# Patient Record
Sex: FEMALE | Race: WHITE | URBAN - NONMETROPOLITAN AREA
[De-identification: names, ages, dates, MRNs, and addresses within clinical notes are randomized per-mention and may not be internally consistent; named-entity substitution may affect disease eponyms.]

---

## 2017-05-03 DIAGNOSIS — I35.0 NONRHEUMATIC AORTIC VALVE STENOSIS: Primary | ICD-10-CM

## 2017-05-03 DIAGNOSIS — J96.10 CHRONIC RESPIRATORY FAILURE, UNSPECIFIED WHETHER WITH HYPOXIA OR HYPERCAPNIA (HCC): ICD-10-CM

## 2017-05-11 RX ORDER — LEVOTHYROXINE SODIUM 0.07 MG/1
75 TABLET ORAL DAILY
Qty: 30 TABLET | Refills: 5 | Status: SHIPPED | OUTPATIENT
Start: 2017-05-11 | End: 2017-11-05 | Stop reason: SDUPTHER

## 2017-05-15 RX ORDER — SIMVASTATIN 20 MG
20 TABLET ORAL NIGHTLY
Qty: 90 TABLET | Refills: 1 | Status: SHIPPED | OUTPATIENT
Start: 2017-05-15 | End: 2017-10-03 | Stop reason: SDUPTHER

## 2017-05-15 RX ORDER — SPIRONOLACTONE 50 MG/1
50 TABLET, FILM COATED ORAL DAILY
Qty: 90 TABLET | Refills: 1 | Status: SHIPPED | OUTPATIENT
Start: 2017-05-15 | End: 2018-05-17 | Stop reason: SDUPTHER

## 2017-05-23 ENCOUNTER — OFFICE VISIT (OUTPATIENT)
Dept: FAMILY MEDICINE CLINIC | Age: 82
End: 2017-05-23
Payer: MEDICARE

## 2017-05-23 VITALS
HEART RATE: 56 BPM | SYSTOLIC BLOOD PRESSURE: 128 MMHG | OXYGEN SATURATION: 90 % | DIASTOLIC BLOOD PRESSURE: 64 MMHG | BODY MASS INDEX: 33.23 KG/M2 | WEIGHT: 159 LBS

## 2017-05-23 DIAGNOSIS — M25.512 CHRONIC PAIN OF BOTH SHOULDERS: Primary | ICD-10-CM

## 2017-05-23 DIAGNOSIS — E03.9 HYPOTHYROIDISM (ACQUIRED): ICD-10-CM

## 2017-05-23 DIAGNOSIS — M25.511 CHRONIC PAIN OF BOTH SHOULDERS: Primary | ICD-10-CM

## 2017-05-23 DIAGNOSIS — I10 ESSENTIAL HYPERTENSION: ICD-10-CM

## 2017-05-23 DIAGNOSIS — I50.9 CHRONIC CONGESTIVE HEART FAILURE, UNSPECIFIED CONGESTIVE HEART FAILURE TYPE: ICD-10-CM

## 2017-05-23 DIAGNOSIS — J41.0 SIMPLE CHRONIC BRONCHITIS (HCC): Chronic | ICD-10-CM

## 2017-05-23 DIAGNOSIS — G89.29 CHRONIC PAIN OF BOTH SHOULDERS: Primary | ICD-10-CM

## 2017-05-23 PROCEDURE — 1123F ACP DISCUSS/DSCN MKR DOCD: CPT | Performed by: FAMILY MEDICINE

## 2017-05-23 PROCEDURE — G8427 DOCREV CUR MEDS BY ELIG CLIN: HCPCS | Performed by: FAMILY MEDICINE

## 2017-05-23 PROCEDURE — 1090F PRES/ABSN URINE INCON ASSESS: CPT | Performed by: FAMILY MEDICINE

## 2017-05-23 PROCEDURE — 3023F SPIROM DOC REV: CPT | Performed by: FAMILY MEDICINE

## 2017-05-23 PROCEDURE — 4040F PNEUMOC VAC/ADMIN/RCVD: CPT | Performed by: FAMILY MEDICINE

## 2017-05-23 PROCEDURE — G8926 SPIRO NO PERF OR DOC: HCPCS | Performed by: FAMILY MEDICINE

## 2017-05-23 PROCEDURE — 99214 OFFICE O/P EST MOD 30 MIN: CPT | Performed by: FAMILY MEDICINE

## 2017-05-23 PROCEDURE — G8417 CALC BMI ABV UP PARAM F/U: HCPCS | Performed by: FAMILY MEDICINE

## 2017-05-23 PROCEDURE — 1036F TOBACCO NON-USER: CPT | Performed by: FAMILY MEDICINE

## 2017-05-23 RX ORDER — CITALOPRAM 10 MG/1
TABLET ORAL
Refills: 0 | COMMUNITY
Start: 2017-05-11 | End: 2017-05-23 | Stop reason: SDUPTHER

## 2017-05-23 ASSESSMENT — ENCOUNTER SYMPTOMS: SHORTNESS OF BREATH: 0

## 2017-05-24 DIAGNOSIS — M25.512 CHRONIC PAIN OF BOTH SHOULDERS: ICD-10-CM

## 2017-05-24 DIAGNOSIS — G89.29 CHRONIC PAIN OF BOTH SHOULDERS: ICD-10-CM

## 2017-05-24 DIAGNOSIS — M25.511 CHRONIC PAIN OF BOTH SHOULDERS: ICD-10-CM

## 2017-06-12 RX ORDER — TRAZODONE HYDROCHLORIDE 50 MG/1
25 TABLET ORAL NIGHTLY
Qty: 30 TABLET | Refills: 5 | Status: SHIPPED | OUTPATIENT
Start: 2017-06-12 | End: 2018-07-10 | Stop reason: SDUPTHER

## 2017-06-22 ENCOUNTER — TELEPHONE (OUTPATIENT)
Dept: FAMILY MEDICINE CLINIC | Age: 82
End: 2017-06-22

## 2017-06-22 DIAGNOSIS — T14.8XXA BRUISING: Primary | ICD-10-CM

## 2017-06-26 DIAGNOSIS — T14.8XXA BRUISING: ICD-10-CM

## 2017-06-26 LAB
BASOPHILS ABSOLUTE: NORMAL /ΜL
BASOPHILS RELATIVE PERCENT: NORMAL %
EOSINOPHILS ABSOLUTE: NORMAL /ΜL
EOSINOPHILS RELATIVE PERCENT: NORMAL %
HCT VFR BLD CALC: NORMAL % (ref 36–46)
HEMOGLOBIN: NORMAL G/DL (ref 12–16)
LYMPHOCYTES ABSOLUTE: NORMAL /ΜL
LYMPHOCYTES RELATIVE PERCENT: NORMAL %
MCH RBC QN AUTO: NORMAL PG
MCHC RBC AUTO-ENTMCNC: NORMAL G/DL
MCV RBC AUTO: NORMAL FL
MONOCYTES ABSOLUTE: NORMAL /ΜL
MONOCYTES RELATIVE PERCENT: NORMAL %
NEUTROPHILS ABSOLUTE: NORMAL /ΜL
NEUTROPHILS RELATIVE PERCENT: NORMAL %
PDW BLD-RTO: NORMAL %
PLATELET # BLD: NORMAL K/ΜL
PMV BLD AUTO: NORMAL FL
RBC # BLD: NORMAL 10^6/ΜL
WBC # BLD: NORMAL 10^3/ML

## 2017-07-06 DIAGNOSIS — T14.8XXA BRUISING: ICD-10-CM

## 2017-08-07 ENCOUNTER — OFFICE VISIT (OUTPATIENT)
Dept: PULMONOLOGY | Age: 82
End: 2017-08-07
Payer: MEDICARE

## 2017-08-07 VITALS
HEIGHT: 58 IN | WEIGHT: 153 LBS | HEART RATE: 56 BPM | OXYGEN SATURATION: 93 % | SYSTOLIC BLOOD PRESSURE: 149 MMHG | DIASTOLIC BLOOD PRESSURE: 71 MMHG | RESPIRATION RATE: 16 BRPM | TEMPERATURE: 97.1 F | BODY MASS INDEX: 32.12 KG/M2

## 2017-08-07 DIAGNOSIS — I50.42 CHRONIC COMBINED SYSTOLIC AND DIASTOLIC CONGESTIVE HEART FAILURE (HCC): ICD-10-CM

## 2017-08-07 DIAGNOSIS — J96.11 CHRONIC RESPIRATORY FAILURE WITH HYPOXIA (HCC): ICD-10-CM

## 2017-08-07 DIAGNOSIS — J44.9 COPD, SEVERITY TO BE DETERMINED (HCC): Primary | ICD-10-CM

## 2017-08-07 PROCEDURE — G8417 CALC BMI ABV UP PARAM F/U: HCPCS | Performed by: INTERNAL MEDICINE

## 2017-08-07 PROCEDURE — 1123F ACP DISCUSS/DSCN MKR DOCD: CPT | Performed by: INTERNAL MEDICINE

## 2017-08-07 PROCEDURE — 1036F TOBACCO NON-USER: CPT | Performed by: INTERNAL MEDICINE

## 2017-08-07 PROCEDURE — 99213 OFFICE O/P EST LOW 20 MIN: CPT | Performed by: INTERNAL MEDICINE

## 2017-08-07 PROCEDURE — G8926 SPIRO NO PERF OR DOC: HCPCS | Performed by: INTERNAL MEDICINE

## 2017-08-07 PROCEDURE — G8427 DOCREV CUR MEDS BY ELIG CLIN: HCPCS | Performed by: INTERNAL MEDICINE

## 2017-08-07 PROCEDURE — 1090F PRES/ABSN URINE INCON ASSESS: CPT | Performed by: INTERNAL MEDICINE

## 2017-08-07 PROCEDURE — 4040F PNEUMOC VAC/ADMIN/RCVD: CPT | Performed by: INTERNAL MEDICINE

## 2017-08-07 PROCEDURE — 3023F SPIROM DOC REV: CPT | Performed by: INTERNAL MEDICINE

## 2017-08-08 ASSESSMENT — ENCOUNTER SYMPTOMS
BACK PAIN: 1
SHORTNESS OF BREATH: 1
GASTROINTESTINAL NEGATIVE: 1
EYES NEGATIVE: 1

## 2017-08-10 ENCOUNTER — OFFICE VISIT (OUTPATIENT)
Dept: PULMONOLOGY | Age: 82
End: 2017-08-10
Payer: MEDICARE

## 2017-08-10 VITALS — OXYGEN SATURATION: 96 % | HEIGHT: 58 IN | WEIGHT: 153 LBS | BODY MASS INDEX: 32.12 KG/M2 | HEART RATE: 63 BPM

## 2017-08-10 DIAGNOSIS — J44.9 CHRONIC OBSTRUCTIVE PULMONARY DISEASE, UNSPECIFIED COPD TYPE (HCC): ICD-10-CM

## 2017-08-10 DIAGNOSIS — J96.11 CHRONIC RESPIRATORY FAILURE WITH HYPOXIA (HCC): ICD-10-CM

## 2017-08-10 DIAGNOSIS — I50.9 CHRONIC CONGESTIVE HEART FAILURE, UNSPECIFIED CONGESTIVE HEART FAILURE TYPE: Primary | ICD-10-CM

## 2017-08-10 PROCEDURE — 94620 PR PULMONARY STRESS TESTING,SIMPLE: CPT | Performed by: INTERNAL MEDICINE

## 2017-08-10 PROCEDURE — 1036F TOBACCO NON-USER: CPT | Performed by: INTERNAL MEDICINE

## 2017-08-12 RX ORDER — BUMETANIDE 1 MG/1
TABLET ORAL
Qty: 290 TABLET | Refills: 1 | Status: SHIPPED | OUTPATIENT
Start: 2017-08-12 | End: 2018-09-16 | Stop reason: SDUPTHER

## 2017-08-14 RX ORDER — OMEPRAZOLE 20 MG/1
20 CAPSULE, DELAYED RELEASE ORAL 2 TIMES DAILY
Qty: 60 CAPSULE | Refills: 5 | Status: SHIPPED | OUTPATIENT
Start: 2017-08-14 | End: 2018-05-23 | Stop reason: SDUPTHER

## 2017-09-06 DIAGNOSIS — J44.9 COPD, SEVERITY TO BE DETERMINED (HCC): ICD-10-CM

## 2017-10-24 LAB
BASOPHILS ABSOLUTE: 0 10'3/UL (ref 0.1–0.2)
BASOPHILS RELATIVE PERCENT: 0.8 % (ref 0–1.7)
CHLORIDE BLD-SCNC: 106 MMOL/L (ref 97–107)
CO2: 27 MMOL/L (ref 21–32)
CREAT SERPL-MCNC: 1.33 MG/DL (ref 0.55–1.02)
EOSINOPHILS ABSOLUTE: 0.2 10'3/UL (ref 0–0.4)
EOSINOPHILS RELATIVE PERCENT: 2.4 % (ref 0–6.4)
HCT VFR BLD CALC: 40.4 % (ref 34.6–44.1)
HEMOGLOBIN: 13.5 G/DL (ref 11.7–14.9)
LYMPHOCYTES ABSOLUTE: 1.3 10'3/UL (ref 0.5–3.5)
LYMPHOCYTES RELATIVE PERCENT: 21.2 % (ref 17.4–45.9)
MCH RBC QN AUTO: 32.9 PG (ref 27.8–33.2)
MCHC RBC AUTO-ENTMCNC: 33.4 G/DL (ref 32.7–34.8)
MCV RBC AUTO: 98.6 FL (ref 83–97.4)
MONOCYTES ABSOLUTE: 0.5 10'3/UL (ref 0.2–0.8)
MONOCYTES RELATIVE PERCENT: 7.3 % (ref 4.4–12)
NEUTROPHILS ABSOLUTE: 4.2 10'3/UL (ref 1.5–5.6)
NEUTROPHILS SEGMENTED: 68.3 % (ref 41.2–72.1)
PDW BLD-RTO: 13 % (ref 12.2–15.8)
PLATELET # BLD: 239 10'3/UL (ref 122–359)
PMV BLD AUTO: 8.6 FL (ref 7.6–10.6)
POTASSIUM SERPL-SCNC: 3.8 MMOL/L (ref 3.5–5.1)
RBC # BLD: 4.1 10'6/UL (ref 3.85–4.88)
SODIUM BLD-SCNC: 143 MMOL/L (ref 136–145)
TSH SERPL DL<=0.05 MIU/L-ACNC: 1.1 UIU/ML (ref 0.36–3.74)
WBC: 6.2 10'3/UL (ref 3.2–9.3)

## 2017-10-24 NOTE — PROGRESS NOTES
Noted, may review at planned f/u appt.  Noting increased renal strain to review further at follow up

## 2017-11-06 RX ORDER — LEVOTHYROXINE SODIUM 0.07 MG/1
75 TABLET ORAL DAILY
Qty: 30 TABLET | Refills: 5 | Status: SHIPPED | OUTPATIENT
Start: 2017-11-06 | End: 2018-06-07 | Stop reason: SDUPTHER

## 2017-11-07 ENCOUNTER — OFFICE VISIT (OUTPATIENT)
Dept: FAMILY MEDICINE CLINIC | Age: 82
End: 2017-11-07
Payer: MEDICARE

## 2017-11-07 VITALS
SYSTOLIC BLOOD PRESSURE: 130 MMHG | HEART RATE: 74 BPM | WEIGHT: 158 LBS | OXYGEN SATURATION: 97 % | HEIGHT: 58 IN | BODY MASS INDEX: 33.17 KG/M2 | DIASTOLIC BLOOD PRESSURE: 76 MMHG

## 2017-11-07 DIAGNOSIS — I10 ESSENTIAL HYPERTENSION: Primary | ICD-10-CM

## 2017-11-07 DIAGNOSIS — N18.30 CKD (CHRONIC KIDNEY DISEASE) STAGE 3, GFR 30-59 ML/MIN (HCC): ICD-10-CM

## 2017-11-07 DIAGNOSIS — I50.9 CHRONIC CONGESTIVE HEART FAILURE, UNSPECIFIED CONGESTIVE HEART FAILURE TYPE: ICD-10-CM

## 2017-11-07 DIAGNOSIS — F39 MOOD DISORDER (HCC): ICD-10-CM

## 2017-11-07 DIAGNOSIS — E78.2 MIXED HYPERLIPIDEMIA: ICD-10-CM

## 2017-11-07 PROCEDURE — G8427 DOCREV CUR MEDS BY ELIG CLIN: HCPCS | Performed by: FAMILY MEDICINE

## 2017-11-07 PROCEDURE — 1036F TOBACCO NON-USER: CPT | Performed by: FAMILY MEDICINE

## 2017-11-07 PROCEDURE — 99214 OFFICE O/P EST MOD 30 MIN: CPT | Performed by: FAMILY MEDICINE

## 2017-11-07 PROCEDURE — 4040F PNEUMOC VAC/ADMIN/RCVD: CPT | Performed by: FAMILY MEDICINE

## 2017-11-07 PROCEDURE — G8417 CALC BMI ABV UP PARAM F/U: HCPCS | Performed by: FAMILY MEDICINE

## 2017-11-07 PROCEDURE — 1090F PRES/ABSN URINE INCON ASSESS: CPT | Performed by: FAMILY MEDICINE

## 2017-11-07 PROCEDURE — 1123F ACP DISCUSS/DSCN MKR DOCD: CPT | Performed by: FAMILY MEDICINE

## 2017-11-07 PROCEDURE — G8484 FLU IMMUNIZE NO ADMIN: HCPCS | Performed by: FAMILY MEDICINE

## 2017-11-07 RX ORDER — CITALOPRAM 10 MG/1
5 TABLET ORAL DAILY
Qty: 90 TABLET | Refills: 3 | Status: SHIPPED | OUTPATIENT
Start: 2017-11-07 | End: 2018-09-15 | Stop reason: SDUPTHER

## 2017-11-07 ASSESSMENT — ENCOUNTER SYMPTOMS: SHORTNESS OF BREATH: 1

## 2017-11-07 NOTE — PROGRESS NOTES
[facility]  [address] [facility]  Dept: [phone]  Dept Fax: [phone]    Dixon Bonilla is a 80 y.o. female who presents today for her medical conditions/complaints as noted below. Dixon Bonilla is c/o of 6 Month Follow-Up; Hypertension; and Hyperlipidemia            HPI:     HPI   Here to follow for HTN, hyperlipidemia    No complaints, persisting O2 with exertion and therapy. Requalified for O2 at night and with exercise per Dr Bianca Saenz in September    Saw Dr Bianca Saenz in August, doing well    Noted forgetful all the time, mild disorientation in ams only. Prior hx of depression without current complaints. Had attempted injection into shoulder with only couple days of improvement noted.      Persisting with therapy and cardiac rehab      BP Readings from Last 3 Encounters:   11/07/17 130/76   08/07/17 (!) 149/71   05/23/17 128/64          (goal 120/80)    Past Medical History:   Diagnosis Date    Anemia     with negative gastrointestinal workup    Anxiety     Aspiration pneumonia (HCC)     Atrial fib/flutter, transient     Basal cell carcinoma of skin     CAD (coronary artery disease)     CHF (congestive heart failure) (HCC)     Chronic constipation     Cough     Severe, unremitting    Depression     Diverticulosis, acute     Epistaxis     GERD (gastroesophageal reflux disease)     H/O tracheostomy     for prolonged respiratory failure after aortic valve replacement    Hypercholesterolemia     Hypertension     Hypothyroid     Hypothyroidism     Left ventricular diastolic dysfunction     Mitral regurgitation     Obesity     Osteopenia     Peptic ulcer     Postnasal drip     without evidence for chronic sinusitis    Reactive airway disease     Respiratory failure (HCC)     Ringworm     Recurrent    Severe aortic stenosis 08/30/2010    23mm benito-lozano AVR, post aortic valve replacement    Shortness of breath     Sleep apnea     Sternal wound dehiscence     related to coughing, post rewiring in 2010      Past Surgical History:   Procedure Laterality Date    AORTIC VALVE REPLACEMENT  8/30/10    23mm benito-lozano    APPENDECTOMY      BLADDER SUSPENSION      CATARACT REMOVAL WITH IMPLANT Bilateral 2002    CHOLECYSTECTOMY      EYE SURGERY  04/2014    Left eye laser surgery    HEMORRHOID SURGERY      KNEE ARTHROPLASTY      left    OTHER SURGICAL HISTORY  9/14/10    sternal rewiring    REFRACTIVE SURGERY      left    SKIN CANCER EXCISION      TONSILLECTOMY AND ADENOIDECTOMY         Family History   Problem Relation Age of Onset    Cancer Father     Hypertension Mother     Stroke Mother     COPD Brother     Parkinsonism Brother        Social History   Substance Use Topics    Smoking status: Never Smoker    Smokeless tobacco: Never Used    Alcohol use No      Current Outpatient Prescriptions   Medication Sig Dispense Refill    citalopram (CELEXA) 10 MG tablet Take 0.5 tablets by mouth daily 90 tablet 3    levothyroxine (SYNTHROID) 75 MCG tablet take 1 tablet by mouth daily 30 tablet 5    ferrous sulfate 325 (65 Fe) MG tablet take 1 tablet by mouth once daily 90 tablet 3    simvastatin (ZOCOR) 20 MG tablet take 1 tablet by mouth every evening 90 tablet 3    omeprazole (PRILOSEC) 20 MG delayed release capsule Take 1 capsule by mouth 2 times daily 60 capsule 5    bumetanide (BUMEX) 1 MG tablet TAKE 1 TABLET BY MOUTH ON TUESDAY, THURSDAY, SATURDAY, AND SUNDAY AND 1 TABLET TWICE DAILY ON MONDAY 290 tablet 1    traZODone (DESYREL) 50 MG tablet Take 0.5 tablets by mouth nightly 30 tablet 5    spironolactone (ALDACTONE) 50 MG tablet Take 1 tablet by mouth daily (Patient taking differently: Take 25 mg by mouth daily ) 90 tablet 1    metoprolol tartrate (LOPRESSOR) 25 MG tablet take 1 tablet by mouth twice a day 60 tablet 5    Handicap Placard MISC Pt with Aortic Stenosis I35.0 and Chonic respiratory disease J96.1 2 each 0    loratadine (CLARITIN) 10 MG tablet Take 10 mg by mouth daily.  aspirin 81 MG chewable tablet Take 81 mg by mouth daily.  budesonide (PULMICORT) 0.5 MG/2ML nebulizer suspension Take 1 ampule by nebulization 2 times daily.  Arformoterol Tartrate (BROVANA) 15 MCG/2ML NEBU Take 1 ampule by nebulization 2 times daily.  hydrALAZINE (APRESOLINE) 25 MG tablet Take 25 mg by mouth 3 times daily.  acetaminophen (TYLENOL) 325 MG tablet Take 650 mg by mouth nightly. No current facility-administered medications for this visit. Allergies   Allergen Reactions    Albuterol     Celebrex [Celecoxib]     Miralax [Polyethylene Glycol] Nausea Only    Sulfa Antibiotics        Health Maintenance   Topic Date Due    DTaP/Tdap/Td vaccine (1 - Tdap) 11/14/1944    Zostavax vaccine  Completed    Flu vaccine  Completed    Pneumococcal low/med risk  Completed       Subjective:      Review of Systems   Constitutional: Negative for fatigue. Respiratory: Positive for shortness of breath (At baseline). Cardiovascular: Negative for chest pain, palpitations and leg swelling. Genitourinary: Negative for frequency. Musculoskeletal:        Shoulder pain continues   Neurological: Negative for dizziness. Hematological: Bruises/bleeds easily. Psychiatric/Behavioral: Positive for confusion (at times, and forgetful). Home BP Checks? Yes, twice weekly at cardiac rehab  Medication Compliant? yes    Objective:     /76   Pulse 74   Ht 4' 10\" (1.473 m)   Wt 158 lb (71.7 kg)   SpO2 97%   BMI 33.02 kg/m²   Physical Exam   Constitutional: She is oriented to person, place, and time. She appears well-developed and well-nourished. No distress. Neck: Neck supple. No thyromegaly present. Cardiovascular: Normal rate. Exam reveals distant heart sounds. No murmur heard.   Pulmonary/Chest: Effort normal and breath sounds normal.   Musculoskeletal: She exhibits no edema. Lymphadenopathy:     She has no cervical adenopathy. Neurological: She is alert and oriented to person, place, and time. Labs from 10/17 noted increased Cr. Assessment:      1. Essential hypertension    2. Chronic congestive heart failure, unspecified congestive heart failure type ([facility]    3. CKD (chronic kidney disease) stage 3, GFR 30-59 ml/min    4. Mixed hyperlipidemia    5. Mood disorder ([facility]                     Plan:     Patient Instructions   Need to increase fluids to help with kidney strain    Orders Placed This Encounter   Procedures    Creatinine, Serum     Standing Status:   Future     Standing Expiration Date:   11/7/2018    Electrolyte Panel     Standing Status:   Future     Standing Expiration Date:   11/7/2018    Microalbumin, Ur     Standing Status:   Future     Standing Expiration Date:   11/7/2018    Lipid Panel     Standing Status:   Future     Standing Expiration Date:   11/7/2018     Order Specific Question:   Is Patient Fasting?/# of Hours     Answer:   10-12     Orders Placed This Encounter   Medications    citalopram (CELEXA) 10 MG tablet     Sig: Take 0.5 tablets by mouth daily     Dispense:  90 tablet     Refill:  3        Return in about 6 months (around 5/7/2018). May stop citalopram at follow up if doing well and monitor memory concerns       Discussed use, benefit, and side effects of prescribed medications. All patient questions answered. Pt voiced understanding. Reviewed health maintenance, reviewed flu vaccine- done in sept. Instructed to continue current medications, diet and exercise. Patient agreed with treatment plan. Follow up as directed.      Electronically signed by David Ricardo MD on 11/7/2017

## 2017-12-11 RX ORDER — HYDRALAZINE HYDROCHLORIDE 25 MG/1
TABLET, FILM COATED ORAL
Qty: 270 TABLET | Refills: 5 | Status: SHIPPED | OUTPATIENT
Start: 2017-12-11 | End: 2019-01-21 | Stop reason: SDUPTHER

## 2018-01-31 RX ORDER — ARFORMOTEROL TARTRATE 15 UG/2ML
1 SOLUTION RESPIRATORY (INHALATION) 2 TIMES DAILY
Qty: 120 ML | Refills: 1 | Status: SHIPPED | OUTPATIENT
Start: 2018-01-31 | End: 2018-03-04 | Stop reason: SDUPTHER

## 2018-02-12 ENCOUNTER — TELEPHONE (OUTPATIENT)
Dept: FAMILY MEDICINE CLINIC | Age: 83
End: 2018-02-12

## 2018-03-12 ENCOUNTER — TELEPHONE (OUTPATIENT)
Dept: FAMILY MEDICINE CLINIC | Age: 83
End: 2018-03-12

## 2018-05-07 LAB
CHLORIDE BLD-SCNC: 106 MMOL/L (ref 97–107)
CHOLESTEROL/HDL RELATIVE RISK: 3.54
CHOLESTEROL: 170 MG/DL (ref 100–200)
CO2: 29 MMOL/L (ref 21–32)
CREAT SERPL-MCNC: 1.06 MG/DL (ref 0.55–1.02)
CREATININE URINE: 198 MG/DL
HDLC SERPL-MCNC: 48 MG/DL (ref 45–60)
LDL CHOLESTEROL: 80 MG/DL (ref 88–198)
MICROALBUMIN UR-MCNC: 11.8 MG/L
MICROALBUMIN/CREAT UR-RTO: 6.5 MG/G
POTASSIUM (K+): 3.9
POTASSIUM SERPL-SCNC: 3.9 MMOL/L (ref 3.5–5.1)
SODIUM BLD-SCNC: 144 MMOL/L (ref 136–145)
TRIGL SERPL-MCNC: 211 MG/DL
VLDLC SERPL CALC-MCNC: 42 MG/DL (ref 5–35)

## 2018-05-14 ENCOUNTER — OFFICE VISIT (OUTPATIENT)
Dept: FAMILY MEDICINE CLINIC | Age: 83
End: 2018-05-14
Payer: MEDICARE

## 2018-05-14 VITALS
DIASTOLIC BLOOD PRESSURE: 70 MMHG | HEART RATE: 60 BPM | SYSTOLIC BLOOD PRESSURE: 130 MMHG | BODY MASS INDEX: 32.6 KG/M2 | WEIGHT: 156 LBS

## 2018-05-14 DIAGNOSIS — I50.9 CHRONIC CONGESTIVE HEART FAILURE, UNSPECIFIED CONGESTIVE HEART FAILURE TYPE: Primary | ICD-10-CM

## 2018-05-14 DIAGNOSIS — E78.2 MIXED HYPERLIPIDEMIA: ICD-10-CM

## 2018-05-14 DIAGNOSIS — I35.0 SEVERE AORTIC STENOSIS: ICD-10-CM

## 2018-05-14 DIAGNOSIS — I10 ESSENTIAL HYPERTENSION: ICD-10-CM

## 2018-05-14 DIAGNOSIS — I34.0 NON-RHEUMATIC MITRAL REGURGITATION: ICD-10-CM

## 2018-05-14 DIAGNOSIS — F39 MOOD DISORDER (HCC): ICD-10-CM

## 2018-05-14 PROCEDURE — G8417 CALC BMI ABV UP PARAM F/U: HCPCS | Performed by: FAMILY MEDICINE

## 2018-05-14 PROCEDURE — 99214 OFFICE O/P EST MOD 30 MIN: CPT | Performed by: FAMILY MEDICINE

## 2018-05-14 PROCEDURE — 1090F PRES/ABSN URINE INCON ASSESS: CPT | Performed by: FAMILY MEDICINE

## 2018-05-14 PROCEDURE — G8427 DOCREV CUR MEDS BY ELIG CLIN: HCPCS | Performed by: FAMILY MEDICINE

## 2018-05-14 PROCEDURE — 1123F ACP DISCUSS/DSCN MKR DOCD: CPT | Performed by: FAMILY MEDICINE

## 2018-05-14 PROCEDURE — 1036F TOBACCO NON-USER: CPT | Performed by: FAMILY MEDICINE

## 2018-05-14 PROCEDURE — 4040F PNEUMOC VAC/ADMIN/RCVD: CPT | Performed by: FAMILY MEDICINE

## 2018-05-14 ASSESSMENT — ENCOUNTER SYMPTOMS: SHORTNESS OF BREATH: 0

## 2018-05-14 ASSESSMENT — PATIENT HEALTH QUESTIONNAIRE - PHQ9
2. FEELING DOWN, DEPRESSED OR HOPELESS: 0
SUM OF ALL RESPONSES TO PHQ QUESTIONS 1-9: 0
SUM OF ALL RESPONSES TO PHQ9 QUESTIONS 1 & 2: 0
1. LITTLE INTEREST OR PLEASURE IN DOING THINGS: 0

## 2018-05-19 RX ORDER — SPIRONOLACTONE 50 MG/1
TABLET, FILM COATED ORAL
Qty: 90 TABLET | Refills: 1 | Status: SHIPPED | OUTPATIENT
Start: 2018-05-19 | End: 2019-05-29 | Stop reason: SDUPTHER

## 2018-07-05 RX ORDER — SIMVASTATIN 20 MG
TABLET ORAL
Qty: 90 TABLET | Refills: 0 | Status: SHIPPED | OUTPATIENT
Start: 2018-07-05 | End: 2018-11-07 | Stop reason: SDUPTHER

## 2018-08-13 ENCOUNTER — OFFICE VISIT (OUTPATIENT)
Dept: PULMONOLOGY | Age: 83
End: 2018-08-13
Payer: MEDICARE

## 2018-08-13 VITALS
TEMPERATURE: 97.5 F | WEIGHT: 152 LBS | SYSTOLIC BLOOD PRESSURE: 128 MMHG | HEIGHT: 58 IN | OXYGEN SATURATION: 93 % | HEART RATE: 62 BPM | BODY MASS INDEX: 31.91 KG/M2 | DIASTOLIC BLOOD PRESSURE: 62 MMHG

## 2018-08-13 DIAGNOSIS — J44.9 COPD, SEVERITY TO BE DETERMINED (HCC): ICD-10-CM

## 2018-08-13 DIAGNOSIS — J96.11 CHRONIC RESPIRATORY FAILURE WITH HYPOXIA (HCC): Primary | ICD-10-CM

## 2018-08-13 DIAGNOSIS — I50.42 CHRONIC COMBINED SYSTOLIC AND DIASTOLIC CONGESTIVE HEART FAILURE (HCC): ICD-10-CM

## 2018-08-13 PROCEDURE — G8417 CALC BMI ABV UP PARAM F/U: HCPCS | Performed by: INTERNAL MEDICINE

## 2018-08-13 PROCEDURE — G8427 DOCREV CUR MEDS BY ELIG CLIN: HCPCS | Performed by: INTERNAL MEDICINE

## 2018-08-13 PROCEDURE — G8926 SPIRO NO PERF OR DOC: HCPCS | Performed by: INTERNAL MEDICINE

## 2018-08-13 PROCEDURE — 1036F TOBACCO NON-USER: CPT | Performed by: INTERNAL MEDICINE

## 2018-08-13 PROCEDURE — 99213 OFFICE O/P EST LOW 20 MIN: CPT | Performed by: INTERNAL MEDICINE

## 2018-08-13 PROCEDURE — 1101F PT FALLS ASSESS-DOCD LE1/YR: CPT | Performed by: INTERNAL MEDICINE

## 2018-08-13 PROCEDURE — 3023F SPIROM DOC REV: CPT | Performed by: INTERNAL MEDICINE

## 2018-08-13 PROCEDURE — 1123F ACP DISCUSS/DSCN MKR DOCD: CPT | Performed by: INTERNAL MEDICINE

## 2018-08-13 PROCEDURE — 1090F PRES/ABSN URINE INCON ASSESS: CPT | Performed by: INTERNAL MEDICINE

## 2018-08-13 PROCEDURE — 4040F PNEUMOC VAC/ADMIN/RCVD: CPT | Performed by: INTERNAL MEDICINE

## 2018-08-13 RX ORDER — OMEPRAZOLE 20 MG/1
CAPSULE, DELAYED RELEASE ORAL
Qty: 180 CAPSULE | Refills: 1 | Status: SHIPPED | OUTPATIENT
Start: 2018-08-13 | End: 2019-01-14 | Stop reason: SDUPTHER

## 2018-08-13 ASSESSMENT — ENCOUNTER SYMPTOMS
EYES NEGATIVE: 1
SHORTNESS OF BREATH: 1
GASTROINTESTINAL NEGATIVE: 1

## 2018-08-14 NOTE — PROGRESS NOTES
congestive heart failure ([facility]          Plan:      1. Continue oxygen. 2. Pulmicort and Brovana aerosols. 3. Flu shot in fall. 4. Continue regular exercise. 5. Return in one year. Sooner if new or advancing symptoms.       Electronically signed by Deisy Lees DO on 8/13/2018 at 11:26 PM

## 2018-08-15 ENCOUNTER — TELEPHONE (OUTPATIENT)
Dept: FAMILY MEDICINE CLINIC | Age: 83
End: 2018-08-15

## 2018-08-15 RX ORDER — ARFORMOTEROL TARTRATE 15 UG/2ML
SOLUTION RESPIRATORY (INHALATION)
Qty: 120 ML | Refills: 5 | Status: SHIPPED | OUTPATIENT
Start: 2018-08-15 | End: 2019-05-09 | Stop reason: SDUPTHER

## 2018-08-15 RX ORDER — BUDESONIDE 0.5 MG/2ML
1 INHALANT ORAL 2 TIMES DAILY
Qty: 60 AMPULE | Refills: 5 | Status: SHIPPED | OUTPATIENT
Start: 2018-08-15 | End: 2019-05-09 | Stop reason: SDUPTHER

## 2018-09-15 DIAGNOSIS — F39 MOOD DISORDER (HCC): ICD-10-CM

## 2018-09-17 RX ORDER — BUMETANIDE 1 MG/1
TABLET ORAL
Qty: 290 TABLET | Refills: 1 | Status: SHIPPED | OUTPATIENT
Start: 2018-09-17 | End: 2019-10-08 | Stop reason: SDUPTHER

## 2018-09-17 RX ORDER — CITALOPRAM 10 MG/1
TABLET ORAL
Qty: 90 TABLET | Refills: 3 | Status: SHIPPED | OUTPATIENT
Start: 2018-09-17 | End: 2019-08-15 | Stop reason: SDUPTHER

## 2018-10-28 DIAGNOSIS — E03.9 ACQUIRED HYPOTHYROIDISM: ICD-10-CM

## 2018-10-29 RX ORDER — LEVOTHYROXINE SODIUM 0.07 MG/1
TABLET ORAL
Qty: 30 TABLET | Refills: 0 | Status: SHIPPED | OUTPATIENT
Start: 2018-10-29 | End: 2018-11-27 | Stop reason: SDUPTHER

## 2018-10-30 LAB
CHLORIDE BLD-SCNC: 106 MMOL/L (ref 97–107)
CO2: 28 MMOL/L (ref 21–32)
CREAT SERPL-MCNC: 1.05 MG/DL (ref 0.55–1.02)
POTASSIUM SERPL-SCNC: 4 MMOL/L (ref 3.5–5.1)
SODIUM BLD-SCNC: 142 MMOL/L (ref 136–145)

## 2018-11-01 LAB — TSH SERPL DL<=0.05 MIU/L-ACNC: 0.96 UIU/ML (ref 0.36–3.74)

## 2018-11-07 ENCOUNTER — OFFICE VISIT (OUTPATIENT)
Dept: FAMILY MEDICINE CLINIC | Age: 83
End: 2018-11-07
Payer: MEDICARE

## 2018-11-07 VITALS
HEART RATE: 72 BPM | SYSTOLIC BLOOD PRESSURE: 130 MMHG | DIASTOLIC BLOOD PRESSURE: 70 MMHG | WEIGHT: 149 LBS | BODY MASS INDEX: 31.14 KG/M2

## 2018-11-07 DIAGNOSIS — E03.9 ACQUIRED HYPOTHYROIDISM: Primary | ICD-10-CM

## 2018-11-07 DIAGNOSIS — J96.10 CHRONIC RESPIRATORY FAILURE, UNSPECIFIED WHETHER WITH HYPOXIA OR HYPERCAPNIA (HCC): ICD-10-CM

## 2018-11-07 DIAGNOSIS — N18.30 CKD (CHRONIC KIDNEY DISEASE) STAGE 3, GFR 30-59 ML/MIN (HCC): ICD-10-CM

## 2018-11-07 DIAGNOSIS — E78.2 MIXED HYPERLIPIDEMIA: ICD-10-CM

## 2018-11-07 DIAGNOSIS — I10 ESSENTIAL HYPERTENSION: ICD-10-CM

## 2018-11-07 DIAGNOSIS — I34.0 NON-RHEUMATIC MITRAL REGURGITATION: ICD-10-CM

## 2018-11-07 PROCEDURE — 1123F ACP DISCUSS/DSCN MKR DOCD: CPT | Performed by: FAMILY MEDICINE

## 2018-11-07 PROCEDURE — 1036F TOBACCO NON-USER: CPT | Performed by: FAMILY MEDICINE

## 2018-11-07 PROCEDURE — 4040F PNEUMOC VAC/ADMIN/RCVD: CPT | Performed by: FAMILY MEDICINE

## 2018-11-07 PROCEDURE — G8417 CALC BMI ABV UP PARAM F/U: HCPCS | Performed by: FAMILY MEDICINE

## 2018-11-07 PROCEDURE — G8484 FLU IMMUNIZE NO ADMIN: HCPCS | Performed by: FAMILY MEDICINE

## 2018-11-07 PROCEDURE — G8427 DOCREV CUR MEDS BY ELIG CLIN: HCPCS | Performed by: FAMILY MEDICINE

## 2018-11-07 PROCEDURE — 99214 OFFICE O/P EST MOD 30 MIN: CPT | Performed by: FAMILY MEDICINE

## 2018-11-07 PROCEDURE — 1090F PRES/ABSN URINE INCON ASSESS: CPT | Performed by: FAMILY MEDICINE

## 2018-11-07 PROCEDURE — 1101F PT FALLS ASSESS-DOCD LE1/YR: CPT | Performed by: FAMILY MEDICINE

## 2018-11-07 RX ORDER — SIMVASTATIN 20 MG
20 TABLET ORAL NIGHTLY
Qty: 90 TABLET | Refills: 3 | Status: SHIPPED | OUTPATIENT
Start: 2018-11-07 | End: 2019-10-08 | Stop reason: SDUPTHER

## 2018-11-07 ASSESSMENT — PATIENT HEALTH QUESTIONNAIRE - PHQ9
2. FEELING DOWN, DEPRESSED OR HOPELESS: 0
1. LITTLE INTEREST OR PLEASURE IN DOING THINGS: 0
SUM OF ALL RESPONSES TO PHQ QUESTIONS 1-9: 0
SUM OF ALL RESPONSES TO PHQ QUESTIONS 1-9: 0
SUM OF ALL RESPONSES TO PHQ9 QUESTIONS 1 & 2: 0

## 2018-11-07 ASSESSMENT — ENCOUNTER SYMPTOMS: SHORTNESS OF BREATH: 0

## 2018-11-27 DIAGNOSIS — E03.9 ACQUIRED HYPOTHYROIDISM: ICD-10-CM

## 2018-11-27 RX ORDER — LEVOTHYROXINE SODIUM 0.07 MG/1
75 TABLET ORAL DAILY
Qty: 90 TABLET | Refills: 1 | Status: SHIPPED | OUTPATIENT
Start: 2018-11-27 | End: 2019-03-05 | Stop reason: SDUPTHER

## 2019-01-14 RX ORDER — OMEPRAZOLE 20 MG/1
CAPSULE, DELAYED RELEASE ORAL
Qty: 180 CAPSULE | Refills: 1 | Status: SHIPPED | OUTPATIENT
Start: 2019-01-14 | End: 2019-08-12 | Stop reason: SDUPTHER

## 2019-01-21 RX ORDER — HYDRALAZINE HYDROCHLORIDE 25 MG/1
TABLET, FILM COATED ORAL
Qty: 270 TABLET | Refills: 5 | Status: SHIPPED | OUTPATIENT
Start: 2019-01-21 | End: 2019-12-09 | Stop reason: SDUPTHER

## 2019-01-31 ENCOUNTER — TELEPHONE (OUTPATIENT)
Dept: FAMILY MEDICINE CLINIC | Age: 84
End: 2019-01-31

## 2019-02-06 ENCOUNTER — TELEPHONE (OUTPATIENT)
Dept: FAMILY MEDICINE CLINIC | Age: 84
End: 2019-02-06

## 2019-03-05 DIAGNOSIS — E03.9 ACQUIRED HYPOTHYROIDISM: ICD-10-CM

## 2019-03-05 RX ORDER — LEVOTHYROXINE SODIUM 0.07 MG/1
TABLET ORAL
Qty: 90 TABLET | Refills: 1 | Status: SHIPPED | OUTPATIENT
Start: 2019-03-05 | End: 2019-08-15 | Stop reason: SDUPTHER

## 2019-04-30 LAB
CHLORIDE BLD-SCNC: 102 MMOL/L (ref 97–107)
CO2: 32 MMOL/L (ref 21–32)
CREAT SERPL-MCNC: 1 MG/DL (ref 0.55–1.02)
POTASSIUM SERPL-SCNC: 3.8 MMOL/L (ref 3.5–5.1)
SODIUM BLD-SCNC: 144 MMOL/L (ref 136–145)
TSH SERPL DL<=0.05 MIU/L-ACNC: 1.14 UIU/ML (ref 0.36–3.74)

## 2019-05-09 RX ORDER — BUDESONIDE 0.5 MG/2ML
1 INHALANT ORAL 2 TIMES DAILY
Qty: 60 AMPULE | Refills: 5 | Status: SHIPPED | OUTPATIENT
Start: 2019-05-09 | End: 2020-04-07 | Stop reason: SDUPTHER

## 2019-05-09 RX ORDER — ARFORMOTEROL TARTRATE 15 UG/2ML
SOLUTION RESPIRATORY (INHALATION)
Qty: 120 ML | Refills: 5 | Status: SHIPPED | OUTPATIENT
Start: 2019-05-09 | End: 2019-11-12 | Stop reason: SDUPTHER

## 2019-05-09 NOTE — TELEPHONE ENCOUNTER
[facility] also has medicare forms that must be completed and sent back with these refills. In your inbox for review/signature.       [facility] is requesting a refill on the following medication(s):  Requested Prescriptions     Pending Prescriptions Disp Refills    Arformoterol Tartrate (BROVANA) 15 MCG/2ML NEBU 120 mL 5     Sig: inhale contents of 1 vial IN NEBULIZER twice a day    budesonide (PULMICORT) 0.5 MG/2ML nebulizer suspension 60 ampule 5     Sig: Take 2 mLs by nebulization 2 times daily       Last Visit Date (If Applicable):  25/8/5022    Next Visit Date:    5/13/2019

## 2019-05-13 ENCOUNTER — OFFICE VISIT (OUTPATIENT)
Dept: FAMILY MEDICINE CLINIC | Age: 84
End: 2019-05-13
Payer: MEDICARE

## 2019-05-13 VITALS
DIASTOLIC BLOOD PRESSURE: 74 MMHG | WEIGHT: 146 LBS | BODY MASS INDEX: 30.64 KG/M2 | HEART RATE: 62 BPM | SYSTOLIC BLOOD PRESSURE: 132 MMHG | HEIGHT: 58 IN | OXYGEN SATURATION: 95 %

## 2019-05-13 DIAGNOSIS — I34.0 NON-RHEUMATIC MITRAL REGURGITATION: ICD-10-CM

## 2019-05-13 DIAGNOSIS — I10 ESSENTIAL HYPERTENSION: Primary | ICD-10-CM

## 2019-05-13 DIAGNOSIS — I50.9 CHRONIC CONGESTIVE HEART FAILURE, UNSPECIFIED HEART FAILURE TYPE (HCC): ICD-10-CM

## 2019-05-13 DIAGNOSIS — N18.30 CKD (CHRONIC KIDNEY DISEASE) STAGE 3, GFR 30-59 ML/MIN (HCC): ICD-10-CM

## 2019-05-13 DIAGNOSIS — I48.0 PAROXYSMAL ATRIAL FIBRILLATION (HCC): ICD-10-CM

## 2019-05-13 DIAGNOSIS — I35.0 SEVERE AORTIC STENOSIS: ICD-10-CM

## 2019-05-13 DIAGNOSIS — J96.11 CHRONIC RESPIRATORY FAILURE WITH HYPOXIA (HCC): ICD-10-CM

## 2019-05-13 DIAGNOSIS — J44.9 COPD, SEVERITY TO BE DETERMINED (HCC): ICD-10-CM

## 2019-05-13 PROCEDURE — 1036F TOBACCO NON-USER: CPT | Performed by: FAMILY MEDICINE

## 2019-05-13 PROCEDURE — 1090F PRES/ABSN URINE INCON ASSESS: CPT | Performed by: FAMILY MEDICINE

## 2019-05-13 PROCEDURE — G8926 SPIRO NO PERF OR DOC: HCPCS | Performed by: FAMILY MEDICINE

## 2019-05-13 PROCEDURE — 99215 OFFICE O/P EST HI 40 MIN: CPT | Performed by: FAMILY MEDICINE

## 2019-05-13 PROCEDURE — 4040F PNEUMOC VAC/ADMIN/RCVD: CPT | Performed by: FAMILY MEDICINE

## 2019-05-13 PROCEDURE — 1123F ACP DISCUSS/DSCN MKR DOCD: CPT | Performed by: FAMILY MEDICINE

## 2019-05-13 PROCEDURE — G8427 DOCREV CUR MEDS BY ELIG CLIN: HCPCS | Performed by: FAMILY MEDICINE

## 2019-05-13 PROCEDURE — G8417 CALC BMI ABV UP PARAM F/U: HCPCS | Performed by: FAMILY MEDICINE

## 2019-05-13 PROCEDURE — 3023F SPIROM DOC REV: CPT | Performed by: FAMILY MEDICINE

## 2019-05-13 ASSESSMENT — PATIENT HEALTH QUESTIONNAIRE - PHQ9
2. FEELING DOWN, DEPRESSED OR HOPELESS: 0
SUM OF ALL RESPONSES TO PHQ QUESTIONS 1-9: 0
1. LITTLE INTEREST OR PLEASURE IN DOING THINGS: 0
SUM OF ALL RESPONSES TO PHQ QUESTIONS 1-9: 0
SUM OF ALL RESPONSES TO PHQ9 QUESTIONS 1 & 2: 0

## 2019-05-13 ASSESSMENT — ENCOUNTER SYMPTOMS: SHORTNESS OF BREATH: 1

## 2019-06-03 ENCOUNTER — OFFICE VISIT (OUTPATIENT)
Dept: FAMILY MEDICINE CLINIC | Age: 84
End: 2019-06-03
Payer: MEDICARE

## 2019-06-03 VITALS
OXYGEN SATURATION: 93 % | HEART RATE: 66 BPM | BODY MASS INDEX: 29.45 KG/M2 | HEIGHT: 60 IN | DIASTOLIC BLOOD PRESSURE: 74 MMHG | SYSTOLIC BLOOD PRESSURE: 116 MMHG | WEIGHT: 150 LBS

## 2019-06-03 DIAGNOSIS — R05.9 COUGH: Primary | ICD-10-CM

## 2019-06-03 DIAGNOSIS — R09.02 HYPOXIA: ICD-10-CM

## 2019-06-03 PROCEDURE — 4040F PNEUMOC VAC/ADMIN/RCVD: CPT | Performed by: FAMILY MEDICINE

## 2019-06-03 PROCEDURE — 1036F TOBACCO NON-USER: CPT | Performed by: FAMILY MEDICINE

## 2019-06-03 PROCEDURE — 1123F ACP DISCUSS/DSCN MKR DOCD: CPT | Performed by: FAMILY MEDICINE

## 2019-06-03 PROCEDURE — 1090F PRES/ABSN URINE INCON ASSESS: CPT | Performed by: FAMILY MEDICINE

## 2019-06-03 PROCEDURE — G8427 DOCREV CUR MEDS BY ELIG CLIN: HCPCS | Performed by: FAMILY MEDICINE

## 2019-06-03 PROCEDURE — G8417 CALC BMI ABV UP PARAM F/U: HCPCS | Performed by: FAMILY MEDICINE

## 2019-06-03 PROCEDURE — 99213 OFFICE O/P EST LOW 20 MIN: CPT | Performed by: FAMILY MEDICINE

## 2019-06-03 RX ORDER — BENZONATATE 200 MG/1
200 CAPSULE ORAL 3 TIMES DAILY PRN
Qty: 30 CAPSULE | Refills: 1 | Status: SHIPPED | OUTPATIENT
Start: 2019-06-03 | End: 2019-06-13

## 2019-06-03 RX ORDER — AZITHROMYCIN 250 MG/1
TABLET, FILM COATED ORAL
Qty: 1 PACKET | Refills: 0 | Status: SHIPPED | OUTPATIENT
Start: 2019-06-03 | End: 2019-06-10 | Stop reason: ALTCHOICE

## 2019-06-03 ASSESSMENT — ENCOUNTER SYMPTOMS
COUGH: 1
BACK PAIN: 1
SHORTNESS OF BREATH: 1

## 2019-06-03 NOTE — PROGRESS NOTES
[address] [address]  Dept: [phone]  Dept Fax: [phone]    Yanelis Moreno is a 80 y.o. female who presents today for her medical conditions/complaints as noted below. Yanelis Moreno c/o of Cough and Congestion      HPI:     HPI  Daughter reports having more issues with breathing lately, has been staying with children in home with colds. No fevers noted  Cough noted since Thursday and O2 levels dropping under 90 even with O2 use only 84-85%.     Have been using home O2 at 2 Liters per Dr Hardik Lou      BP Readings from Last 3 Encounters:   06/03/19 116/74   05/13/19 132/74   11/07/18 130/70          (goal 120/80)    Past Medical History:   Diagnosis Date    Anemia     with negative gastrointestinal workup    Anxiety     Aspiration pneumonia (HCC)     Atrial fib/flutter, transient     Basal cell carcinoma of skin     CAD (coronary artery disease)     CHF (congestive heart failure) (HCC)     Chronic constipation     Cough     Severe, unremitting    Depression     Diverticulosis, acute     Epistaxis     GERD (gastroesophageal reflux disease)     H/O tracheostomy     for prolonged respiratory failure after aortic valve replacement    Hypercholesterolemia     Hypertension     Hypothyroid     Hypothyroidism     Left ventricular diastolic dysfunction     Mitral regurgitation     Obesity     Osteopenia     Peptic ulcer     Postnasal drip     without evidence for chronic sinusitis    Reactive airway disease     Respiratory failure (HCC)     Ringworm     Recurrent    Severe aortic stenosis 08/30/2010    23mm benito-lozano AVR, post aortic valve replacement    Shortness of breath     Sleep apnea     Sternal wound dehiscence     related to coughing, post rewiring in 2010      Past Surgical History:   Procedure Laterality Date    AORTIC VALVE REPLACEMENT  8/30/10    23mm benito-lozano    APPENDECTOMY      BLADDER SUSPENSION      CATARACT REMOVAL WITH IMPLANT Bilateral 2002    CHOLECYSTECTOMY      EYE SURGERY  04/2014    Left eye laser surgery    HEMORRHOID SURGERY      KNEE ARTHROPLASTY      left    OTHER SURGICAL HISTORY  9/14/10    sternal rewiring    REFRACTIVE SURGERY      left    SKIN CANCER EXCISION      TONSILLECTOMY AND ADENOIDECTOMY         Family History   Problem Relation Age of Onset    Cancer Father     Hypertension Mother     Stroke Mother     COPD Brother     Parkinsonism Brother        Social History     Tobacco Use    Smoking status: Never Smoker    Smokeless tobacco: Never Used   Substance Use Topics    Alcohol use: No      Prior to Admission medications    Medication Sig Start Date End Date Taking?  Authorizing Provider   benzonatate (TESSALON) 200 MG capsule Take 1 capsule by mouth 3 times daily as needed for Cough 6/3/19 6/13/19 Yes Chris Núñez MD   azithromycin (ZITHROMAX) 250 MG tablet Take 2 tabs (500 mg) on Day 1, and take 1 tab (250 mg) on days 2 through 5. 6/3/19  Yes Chris Núñez MD   spironolactone (ALDACTONE) 50 MG tablet take 1/2 tablet by mouth once daily 5/31/19  Yes Chris Núñez MD   Arformoterol Tartrate (BROVANA) 15 MCG/2ML NEBU inhale contents of 1 vial IN NEBULIZER twice a day 5/9/19  Yes Chris Núñez MD   budesonide (PULMICORT) 0.5 MG/2ML nebulizer suspension Take 2 mLs by nebulization 2 times daily 5/9/19  Yes Chris Núñez MD   levothyroxine (SYNTHROID) 75 MCG tablet take 1 tablet by mouth once daily 3/5/19  Yes Chris Núñez MD   hydrALAZINE (APRESOLINE) 25 MG tablet take 1 tablet by mouth three times a day AT NOON, DINNER, AND AT NIGHT NOT IN THE MORNING 1/21/19  Yes Chris Núñez MD   omeprazole (PRILOSEC) 20 MG delayed release capsule take 1 capsule by mouth twice a day  Patient taking differently: take 1 capsule by mouth twice a day PRN 1/14/19  Yes Chris Núñez MD   simvastatin (ZOCOR) 20 MG tablet Take 1 tablet by mouth nightly 11/7/18  Yes Alfonso Cortes MD   metoprolol tartrate (LOPRESSOR) 25 MG tablet take 1 tablet by mouth twice a day 11/5/18  Yes Alfonso Cortes MD   ferrous sulfate 325 (65 Fe) MG tablet take 1 tablet by mouth once daily 10/1/18  Yes Alfonso Cortes MD   citalopram (CELEXA) 10 MG tablet take 1/2 tablet by mouth once daily 9/17/18  Yes Alfonso Cortes MD   bumetanide (BUMEX) 1 MG tablet TAKE 1 TABLET BY MOUTH ON TUES, THURS, SAT, AND SUN, AND 1 TAB BY MOUTH TWICE DAILY ON MON. WED, AND FRI 9/17/18  Yes Alfonso Cortes MD   traZODone (DESYREL) 50 MG tablet take 1/2 tablet by mouth at bedtime 7/11/18  Yes Alfonso Cortes MD   Handicap Placard [facility] Pt with Aortic Stenosis I35.0 and Chonic respiratory disease J96.1 5/3/17  Yes Alfonso Cortes MD   loratadine (CLARITIN) 10 MG tablet Take 10 mg by mouth daily. Yes Historical Provider, MD   aspirin 81 MG chewable tablet Take 81 mg by mouth daily. Yes Historical Provider, MD   acetaminophen (TYLENOL) 325 MG tablet Take 650 mg by mouth nightly. Yes Historical Provider, MD     Allergies   Allergen Reactions    Albuterol     Celebrex [Celecoxib]     Miralax [Polyethylene Glycol] Nausea Only    Sulfa Antibiotics        Health Maintenance   Topic Date Due    DTaP/Tdap/Td vaccine (1 - Tdap) 05/07/2021 (Originally 11/14/1944)    Potassium monitoring  04/30/2020    Creatinine monitoring  04/30/2020    Flu vaccine  Completed    Shingles Vaccine  Completed    Pneumococcal 65+ years Vaccine  Completed       Subjective:      Review of Systems   Constitutional:        Has been staying with her kids who have had colds     HENT: Positive for congestion. Respiratory: Positive for cough (daughter gave her cough syrup and it didnt help. Cough started Thursday evening) and shortness of breath. O2 levels this weekend were under 90 with oxygen     Cardiovascular: Negative for chest pain and leg swelling. Musculoskeletal: Positive for back pain.    Neurological: Positive for dizziness. Objective:     /74 (Site: Right Upper Arm, Position: Sitting, Cuff Size: Large Adult)   Pulse 66   Ht 5' (1.524 m)   Wt 150 lb (68 kg)   SpO2 93%   BMI 29.29 kg/m²     Physical Exam   Constitutional: She is oriented to person, place, and time. She appears well-developed and well-nourished. Mild cough without distress noted   Neck: Neck supple. No thyromegaly present. Cardiovascular: Normal rate and regular rhythm. No murmur heard. Pulmonary/Chest: Effort normal. No respiratory distress. She has decreased breath sounds in the right lower field. She has no wheezes. She has rhonchi in the right upper field and the right middle field. Musculoskeletal: She exhibits no edema. Lymphadenopathy:     She has no cervical adenopathy. Neurological: She is alert and oriented to person, place, and time. Psychiatric: She has a normal mood and affect. Assessment:     1. Cough    2. Hypoxia      No results found for this visit on 06/03/19. Plan:   No orders of the defined types were placed in this encounter. Orders Placed This Encounter   Medications    benzonatate (TESSALON) 200 MG capsule     Sig: Take 1 capsule by mouth 3 times daily as needed for Cough     Dispense:  30 capsule     Refill:  1    azithromycin (ZITHROMAX) 250 MG tablet     Sig: Take 2 tabs (500 mg) on Day 1, and take 1 tab (250 mg) on days 2 through 5. Dispense:  1 packet     Refill:  0       If not improving by end of the week would contact pulmonary to consider inpatient treatment    Return if symptoms worsen or fail to improve, as planned. Discussed use, benefit, and side effects of prescribed medications. All patient questions answered. Patient agreed with treatment plan. Follow up as directed.      Electronically signed by Adrian Mancilla MD on 6/3/2019

## 2019-06-10 ENCOUNTER — OFFICE VISIT (OUTPATIENT)
Dept: PULMONOLOGY | Age: 84
End: 2019-06-10
Payer: MEDICARE

## 2019-06-10 VITALS
HEART RATE: 51 BPM | RESPIRATION RATE: 14 BRPM | HEIGHT: 60 IN | TEMPERATURE: 97.5 F | OXYGEN SATURATION: 93 % | DIASTOLIC BLOOD PRESSURE: 66 MMHG | WEIGHT: 147 LBS | SYSTOLIC BLOOD PRESSURE: 126 MMHG | BODY MASS INDEX: 28.86 KG/M2

## 2019-06-10 DIAGNOSIS — R05.8 POST-VIRAL COUGH SYNDROME: ICD-10-CM

## 2019-06-10 DIAGNOSIS — I35.0 SEVERE AORTIC STENOSIS: ICD-10-CM

## 2019-06-10 DIAGNOSIS — J44.9 COPD, SEVERITY TO BE DETERMINED (HCC): ICD-10-CM

## 2019-06-10 DIAGNOSIS — J96.11 CHRONIC RESPIRATORY FAILURE WITH HYPOXIA (HCC): Primary | ICD-10-CM

## 2019-06-10 PROCEDURE — G8427 DOCREV CUR MEDS BY ELIG CLIN: HCPCS | Performed by: NURSE PRACTITIONER

## 2019-06-10 PROCEDURE — 99213 OFFICE O/P EST LOW 20 MIN: CPT | Performed by: NURSE PRACTITIONER

## 2019-06-10 PROCEDURE — G8417 CALC BMI ABV UP PARAM F/U: HCPCS | Performed by: NURSE PRACTITIONER

## 2019-06-10 PROCEDURE — 3023F SPIROM DOC REV: CPT | Performed by: NURSE PRACTITIONER

## 2019-06-10 PROCEDURE — 4040F PNEUMOC VAC/ADMIN/RCVD: CPT | Performed by: NURSE PRACTITIONER

## 2019-06-10 PROCEDURE — 1123F ACP DISCUSS/DSCN MKR DOCD: CPT | Performed by: NURSE PRACTITIONER

## 2019-06-10 PROCEDURE — 1036F TOBACCO NON-USER: CPT | Performed by: NURSE PRACTITIONER

## 2019-06-10 PROCEDURE — G8926 SPIRO NO PERF OR DOC: HCPCS | Performed by: NURSE PRACTITIONER

## 2019-06-10 PROCEDURE — 1090F PRES/ABSN URINE INCON ASSESS: CPT | Performed by: NURSE PRACTITIONER

## 2019-06-10 ASSESSMENT — ENCOUNTER SYMPTOMS
ALLERGIC/IMMUNOLOGIC NEGATIVE: 1
EYES NEGATIVE: 1
GASTROINTESTINAL NEGATIVE: 1

## 2019-06-10 NOTE — PATIENT INSTRUCTIONS
Per daughter- [place] will need to re- qualify for 02 in Aug.  Added 6 min walk test to her appt.   LS

## 2019-06-10 NOTE — PROGRESS NOTES
Subjective:      Patient ID: Whitney Watkins is a 80 y.o. female. HPI:    Patient here for sick call. Patient was last seen by her primary care provider and treated for presumed bacterial infection/pneumonia in her right lung with a course of azithromycin. Patient had been feeling poorly for 7 to 10 days, with associated cough, increased oxygen needs and rhinorrhea. Patient has been having difficulty with urinary incontinence with coughing spells. She completed her azithromycin on 6/7/2019 and has been feeling better since then with gradual improvement in cough. She is accompanied by her daughter who states that the cough is generally nonproductive, rhinorrhea has been clear, with no fevers. Patient typically wears her oxygen at 2 L/min via nasal cannula with activity and overnight. She has been wearing it an additional 2 to 3 hours during the day while she has been sick. Patient was also prescribed Tessalon Perles with no relief and cough. Daughter gave patient Tussin DM cough syrup last night with some relief in her cough. Continues with cardiac rehab, and [facility] in [place]. Resides with adult children. Medications:   Brovana: BID  Budesonide: BID    No flowsheet data found.     /66   Pulse 51   Temp 97.5 °F (36.4 °C) (Oral)   Resp 14   Ht 5' (1.524 m) Comment: wheel chair  Wt 147 lb (66.7 kg)   SpO2 93% Comment: room air at rest  BMI 28.71 kg/m²     Past Medical History:   Diagnosis Date    Anemia     with negative gastrointestinal workup    Anxiety     Aspiration pneumonia (HCC)     Atrial fib/flutter, transient     Basal cell carcinoma of skin     CAD (coronary artery disease)     CHF (congestive heart failure) (HCC)     Chronic constipation     Cough     Severe, unremitting    Depression     Diverticulosis, acute     Epistaxis     GERD (gastroesophageal reflux disease)     H/O tracheostomy     for prolonged respiratory failure after aortic valve replacement    Hypercholesterolemia     Hypertension     Hypothyroid     Hypothyroidism     Left ventricular diastolic dysfunction     Mitral regurgitation     Obesity     Osteopenia     Peptic ulcer     Postnasal drip     without evidence for chronic sinusitis    Reactive airway disease     Respiratory failure (HCC)     Ringworm     Recurrent    Severe aortic stenosis 08/30/2010    23mm benito-lozano AVR, post aortic valve replacement    Shortness of breath     Sleep apnea     Sternal wound dehiscence     related to coughing, post rewiring in 2010     Past Surgical History:   Procedure Laterality Date    AORTIC VALVE REPLACEMENT  8/30/10    23mm benito-lozano    APPENDECTOMY      BLADDER SUSPENSION      CATARACT REMOVAL WITH IMPLANT Bilateral 2002    CHOLECYSTECTOMY      EYE SURGERY  04/2014    Left eye laser surgery    HEMORRHOID SURGERY      KNEE ARTHROPLASTY      left    OTHER SURGICAL HISTORY  9/14/10    sternal rewiring    REFRACTIVE SURGERY      left    SKIN CANCER EXCISION      TONSILLECTOMY AND ADENOIDECTOMY          Family History   Problem Relation Age of Onset    Cancer Father     Hypertension Mother     Stroke Mother     COPD Brother     Parkinsonism Brother        Social History     Socioeconomic History    Marital status:      Spouse name: Not on file    Number of children: Not on file    Years of education: Not on file    Highest education level: Not on file   Occupational History    Occupation: retired   Social Needs    Financial resource strain: Not on file    Food insecurity:     Worry: Not on file     Inability: Not on file   cinvolve needs:     Medical: Not on file     Non-medical: Not on file   Tobacco Use    Smoking status: Never Smoker    Smokeless tobacco: Never Used   Substance and Sexual Activity    Alcohol use: No    Drug use: No    Sexual activity: Not on file   Lifestyle    Physical activity:     Days per week: Not on file Minutes per session: Not on file    Stress: Not on file   Relationships    Social connections:     Talks on phone: Not on file     Gets together: Not on file     Attends [employer] service: Not on file     Active member of club or organization: Not on file     Attends meetings of clubs or organizations: Not on file     Relationship status: Not on file    Intimate partner violence:     Fear of current or ex partner: Not on file     Emotionally abused: Not on file     Physically abused: Not on file     Forced sexual activity: Not on file   Other Topics Concern    Not on file   Social History Narrative    Not on file       Review of Systems   Constitutional:        Decreased activity tolerance   HENT:        Clear rhinorrhea, nonproductive cough   Eyes: Negative. Respiratory:        Dyspnea on exertion, chronic oxygen use 2 L/min nocturnal.  Increased oxygen use during acute illness   Cardiovascular:        Lateral lower extremity edema   Gastrointestinal: Negative. Endocrine: Negative. Genitourinary:        Urinary incontinence with coughing   Musculoskeletal: Negative. Allergic/Immunologic: Negative. Neurological: Negative. Hematological: Negative. Psychiatric/Behavioral: Negative for behavioral problems. Objective:      Physical Exam  General appearance - alert, well appearing, and in no distress and overweight. Wheelchair bound  Mental status -alert to self, does not appear to be in any distress. Eyes - pupils equal and reactive, extraocular eye movements intact  Ears - not examined  Nose - normal and patent, no erythema, discharge or polyps and clear rhinorrhea  Mouth - mucous membranes moist, pharynx normal without lesions  Neck - supple, no significant adenopathy  Chest - clear to auscultation, no wheezes, rales or rhonchi, symmetric air entry, occasional witnessed cough nonproductive.   Heart -normal rate, regular rhythm, normal S1, S2, no murmurs, rubs, clicks or gallops  Abdomen - soft, nontender, nondistended, no masses or organomegaly  Neurological - alert, oriented, normal speech, no focal findings or movement disorder noted}  Extremities - peripheral pulses normal, no pedal edema, no clubbing or cyanosis  Skin - normal coloration and turgor, no rashes, no suspicious skin lesions noted     Wt Readings from Last 3 Encounters:   06/10/19 147 lb (66.7 kg)   06/03/19 150 lb (68 kg)   05/13/19 146 lb (66.2 kg)       Results for orders placed or performed in visit on 04/30/19   Creatinine   Result Value Ref Range    CREATININE 1.00 0.55 - 1.02 mg/dL   ELECTROLYTES   Result Value Ref Range    Sodium 144 136 - 145 mmol/L    Potassium 3.8 3.5 - 5.1 mmol/L    Chloride 102 97 - 107 mmol/L    CO2 32 21 - 32 mmol/L   TSH 3RD GENERATION   Result Value Ref Range    TSH 1.141 0.358 - 3.74 uIU/mL       No results found. Assessment:      1. Chronic respiratory failure with hypoxia (HCC)    2. Severe aortic stenosis    3. Post-viral cough syndrome          Plan:      1. Medications reviewed, continue as orders. 2. Refills were provided - no  3. Educated and clarified the medication use. 4. Recommend flu vaccination in the fall annually. Due in fall   5. recommendations given regarding pneumococcal vaccinations. Up-to-date  6. Patient is up-to-date withvaccinations from pulmonary perspective. Up-to-date  7. Maintain an active lifestyle. 8. Patient's questions were answered to her satisfaction. 9. Supplemental oxygen was continued at 2 L/min nocturnal, and as needed   10. Lifelong non-smoker  11.  We'll see the patient back in 2 months        Electronically signed by JOSE Acosta CNP on 6/10/2019 at 2:08 PM

## 2019-07-23 RX ORDER — TRAZODONE HYDROCHLORIDE 50 MG/1
TABLET ORAL
Qty: 75 TABLET | Refills: 3 | Status: SHIPPED | OUTPATIENT
Start: 2019-07-23 | End: 2020-10-11

## 2019-08-12 ENCOUNTER — OFFICE VISIT (OUTPATIENT)
Dept: PULMONOLOGY | Age: 84
End: 2019-08-12
Payer: MEDICARE

## 2019-08-12 VITALS
BODY MASS INDEX: 31.07 KG/M2 | RESPIRATION RATE: 14 BRPM | WEIGHT: 144 LBS | DIASTOLIC BLOOD PRESSURE: 62 MMHG | OXYGEN SATURATION: 94 % | HEART RATE: 55 BPM | HEIGHT: 57 IN | SYSTOLIC BLOOD PRESSURE: 121 MMHG

## 2019-08-12 DIAGNOSIS — J44.9 COPD, SEVERITY TO BE DETERMINED (HCC): ICD-10-CM

## 2019-08-12 DIAGNOSIS — J96.11 CHRONIC RESPIRATORY FAILURE WITH HYPOXIA (HCC): Primary | ICD-10-CM

## 2019-08-12 DIAGNOSIS — I50.42 CHRONIC COMBINED SYSTOLIC AND DIASTOLIC CONGESTIVE HEART FAILURE (HCC): ICD-10-CM

## 2019-08-12 DIAGNOSIS — I35.0 SEVERE AORTIC STENOSIS: ICD-10-CM

## 2019-08-12 PROCEDURE — 1123F ACP DISCUSS/DSCN MKR DOCD: CPT | Performed by: INTERNAL MEDICINE

## 2019-08-12 PROCEDURE — G8427 DOCREV CUR MEDS BY ELIG CLIN: HCPCS | Performed by: INTERNAL MEDICINE

## 2019-08-12 PROCEDURE — 4040F PNEUMOC VAC/ADMIN/RCVD: CPT | Performed by: INTERNAL MEDICINE

## 2019-08-12 PROCEDURE — G8417 CALC BMI ABV UP PARAM F/U: HCPCS | Performed by: INTERNAL MEDICINE

## 2019-08-12 PROCEDURE — G8926 SPIRO NO PERF OR DOC: HCPCS | Performed by: INTERNAL MEDICINE

## 2019-08-12 PROCEDURE — 1036F TOBACCO NON-USER: CPT | Performed by: INTERNAL MEDICINE

## 2019-08-12 PROCEDURE — 3023F SPIROM DOC REV: CPT | Performed by: INTERNAL MEDICINE

## 2019-08-12 PROCEDURE — 1090F PRES/ABSN URINE INCON ASSESS: CPT | Performed by: INTERNAL MEDICINE

## 2019-08-12 PROCEDURE — 99213 OFFICE O/P EST LOW 20 MIN: CPT | Performed by: INTERNAL MEDICINE

## 2019-08-12 RX ORDER — OMEPRAZOLE 20 MG/1
CAPSULE, DELAYED RELEASE ORAL
Qty: 180 CAPSULE | Refills: 1 | Status: SHIPPED | OUTPATIENT
Start: 2019-08-12 | End: 2020-03-05 | Stop reason: SDUPTHER

## 2019-08-12 ASSESSMENT — ENCOUNTER SYMPTOMS
GASTROINTESTINAL NEGATIVE: 1
EYES NEGATIVE: 1
SHORTNESS OF BREATH: 1

## 2019-08-15 DIAGNOSIS — F39 MOOD DISORDER (HCC): ICD-10-CM

## 2019-08-15 DIAGNOSIS — E03.9 ACQUIRED HYPOTHYROIDISM: ICD-10-CM

## 2019-08-15 RX ORDER — CITALOPRAM 10 MG/1
TABLET ORAL
Qty: 90 TABLET | Refills: 3 | Status: SHIPPED | OUTPATIENT
Start: 2019-08-15 | End: 2020-10-11

## 2019-08-15 RX ORDER — LEVOTHYROXINE SODIUM 0.07 MG/1
TABLET ORAL
Qty: 90 TABLET | Refills: 1 | Status: SHIPPED | OUTPATIENT
Start: 2019-08-15 | End: 2020-02-17

## 2019-10-08 RX ORDER — SPIRONOLACTONE 50 MG/1
TABLET, FILM COATED ORAL
Qty: 45 TABLET | Refills: 1 | Status: SHIPPED | OUTPATIENT
Start: 2019-10-08 | End: 2019-12-09 | Stop reason: SDUPTHER

## 2019-10-08 RX ORDER — FERROUS SULFATE 325(65) MG
TABLET ORAL
Qty: 90 TABLET | Refills: 3 | Status: SHIPPED | OUTPATIENT
Start: 2019-10-08 | End: 2020-07-08 | Stop reason: ALTCHOICE

## 2019-10-08 RX ORDER — BUMETANIDE 1 MG/1
TABLET ORAL
Qty: 130 TABLET | Refills: 1 | Status: SHIPPED | OUTPATIENT
Start: 2019-10-08 | End: 2019-11-12 | Stop reason: SDUPTHER

## 2019-10-08 RX ORDER — SIMVASTATIN 20 MG
TABLET ORAL
Qty: 90 TABLET | Refills: 3 | Status: SHIPPED | OUTPATIENT
Start: 2019-10-08 | End: 2020-12-09

## 2019-11-11 ENCOUNTER — TELEPHONE (OUTPATIENT)
Dept: FAMILY MEDICINE CLINIC | Age: 84
End: 2019-11-11

## 2019-11-11 DIAGNOSIS — I35.0 SEVERE AORTIC STENOSIS: ICD-10-CM

## 2019-11-11 DIAGNOSIS — J96.11 CHRONIC RESPIRATORY FAILURE WITH HYPOXIA (HCC): Primary | ICD-10-CM

## 2019-11-12 RX ORDER — BUMETANIDE 1 MG/1
TABLET ORAL
Qty: 130 TABLET | Refills: 1 | Status: SHIPPED | OUTPATIENT
Start: 2019-11-12 | End: 2020-10-06

## 2019-11-12 RX ORDER — ARFORMOTEROL TARTRATE 15 UG/2ML
SOLUTION RESPIRATORY (INHALATION)
Qty: 120 ML | Refills: 5 | Status: SHIPPED | OUTPATIENT
Start: 2019-11-12 | End: 2020-04-07 | Stop reason: SDUPTHER

## 2019-12-09 ENCOUNTER — OFFICE VISIT (OUTPATIENT)
Dept: FAMILY MEDICINE CLINIC | Age: 84
End: 2019-12-09
Payer: MEDICARE

## 2019-12-09 VITALS
HEART RATE: 62 BPM | WEIGHT: 143 LBS | OXYGEN SATURATION: 93 % | DIASTOLIC BLOOD PRESSURE: 64 MMHG | BODY MASS INDEX: 32.17 KG/M2 | SYSTOLIC BLOOD PRESSURE: 110 MMHG | HEIGHT: 56 IN

## 2019-12-09 DIAGNOSIS — Z00.00 ROUTINE GENERAL MEDICAL EXAMINATION AT A HEALTH CARE FACILITY: Primary | ICD-10-CM

## 2019-12-09 DIAGNOSIS — N18.30 CKD (CHRONIC KIDNEY DISEASE) STAGE 3, GFR 30-59 ML/MIN (HCC): ICD-10-CM

## 2019-12-09 DIAGNOSIS — I35.0 SEVERE AORTIC STENOSIS: ICD-10-CM

## 2019-12-09 DIAGNOSIS — J44.9 COPD, SEVERITY TO BE DETERMINED (HCC): ICD-10-CM

## 2019-12-09 DIAGNOSIS — J96.11 CHRONIC RESPIRATORY FAILURE WITH HYPOXIA (HCC): ICD-10-CM

## 2019-12-09 DIAGNOSIS — R41.3 MEMORY DEFICIT: ICD-10-CM

## 2019-12-09 DIAGNOSIS — I10 ESSENTIAL HYPERTENSION: ICD-10-CM

## 2019-12-09 PROCEDURE — G8482 FLU IMMUNIZE ORDER/ADMIN: HCPCS | Performed by: FAMILY MEDICINE

## 2019-12-09 PROCEDURE — 4040F PNEUMOC VAC/ADMIN/RCVD: CPT | Performed by: FAMILY MEDICINE

## 2019-12-09 PROCEDURE — 1123F ACP DISCUSS/DSCN MKR DOCD: CPT | Performed by: FAMILY MEDICINE

## 2019-12-09 PROCEDURE — G0439 PPPS, SUBSEQ VISIT: HCPCS | Performed by: FAMILY MEDICINE

## 2019-12-09 RX ORDER — SPIRONOLACTONE 25 MG/1
TABLET ORAL
Qty: 45 TABLET | Refills: 1 | Status: SHIPPED | OUTPATIENT
Start: 2019-12-09 | End: 2020-05-18

## 2019-12-09 RX ORDER — HYDRALAZINE HYDROCHLORIDE 25 MG/1
25 TABLET, FILM COATED ORAL 2 TIMES DAILY
Qty: 180 TABLET | Refills: 1 | Status: SHIPPED | OUTPATIENT
Start: 2019-12-09 | End: 2020-09-29

## 2019-12-09 RX ORDER — RIVASTIGMINE TARTRATE 1.5 MG/1
1.5 CAPSULE ORAL 2 TIMES DAILY
Qty: 60 CAPSULE | Refills: 3 | Status: SHIPPED | OUTPATIENT
Start: 2019-12-09 | End: 2020-03-03

## 2019-12-09 ASSESSMENT — PATIENT HEALTH QUESTIONNAIRE - PHQ9
1. LITTLE INTEREST OR PLEASURE IN DOING THINGS: 0
SUM OF ALL RESPONSES TO PHQ9 QUESTIONS 1 & 2: 0
SUM OF ALL RESPONSES TO PHQ QUESTIONS 1-9: 0
2. FEELING DOWN, DEPRESSED OR HOPELESS: 0
SUM OF ALL RESPONSES TO PHQ QUESTIONS 1-9: 0

## 2019-12-09 ASSESSMENT — ENCOUNTER SYMPTOMS: SHORTNESS OF BREATH: 0

## 2020-03-02 LAB
ANION GAP SERPL CALCULATED.3IONS-SCNC: 9 MEQ/L (ref 5–13)
CHLORIDE BLD-SCNC: 103 MMOL/L (ref 98–107)
CO2: 29 MMOL/L (ref 23–31)
CREAT SERPL-MCNC: 0.8 MG/DL (ref 0.57–1.11)
POTASSIUM SERPL-SCNC: 3.8 MMOL/L (ref 3.5–5.1)
SODIUM BLD-SCNC: 141 MMOL/L (ref 136–145)

## 2020-03-05 ENCOUNTER — OFFICE VISIT (OUTPATIENT)
Dept: FAMILY MEDICINE CLINIC | Age: 85
End: 2020-03-05
Payer: MEDICARE

## 2020-03-05 VITALS
OXYGEN SATURATION: 91 % | WEIGHT: 142 LBS | HEART RATE: 57 BPM | BODY MASS INDEX: 31.94 KG/M2 | DIASTOLIC BLOOD PRESSURE: 78 MMHG | SYSTOLIC BLOOD PRESSURE: 118 MMHG | HEIGHT: 56 IN

## 2020-03-05 PROBLEM — R41.3 MEMORY DEFICIT: Status: ACTIVE | Noted: 2020-03-05

## 2020-03-05 PROCEDURE — 99211 OFF/OP EST MAY X REQ PHY/QHP: CPT | Performed by: FAMILY MEDICINE

## 2020-03-05 PROCEDURE — G8926 SPIRO NO PERF OR DOC: HCPCS | Performed by: FAMILY MEDICINE

## 2020-03-05 PROCEDURE — 3023F SPIROM DOC REV: CPT | Performed by: FAMILY MEDICINE

## 2020-03-05 PROCEDURE — 1036F TOBACCO NON-USER: CPT | Performed by: FAMILY MEDICINE

## 2020-03-05 PROCEDURE — G8482 FLU IMMUNIZE ORDER/ADMIN: HCPCS | Performed by: FAMILY MEDICINE

## 2020-03-05 PROCEDURE — 4040F PNEUMOC VAC/ADMIN/RCVD: CPT | Performed by: FAMILY MEDICINE

## 2020-03-05 PROCEDURE — 1123F ACP DISCUSS/DSCN MKR DOCD: CPT | Performed by: FAMILY MEDICINE

## 2020-03-05 PROCEDURE — G8427 DOCREV CUR MEDS BY ELIG CLIN: HCPCS | Performed by: FAMILY MEDICINE

## 2020-03-05 PROCEDURE — 99214 OFFICE O/P EST MOD 30 MIN: CPT | Performed by: FAMILY MEDICINE

## 2020-03-05 PROCEDURE — 1090F PRES/ABSN URINE INCON ASSESS: CPT | Performed by: FAMILY MEDICINE

## 2020-03-05 PROCEDURE — G8417 CALC BMI ABV UP PARAM F/U: HCPCS | Performed by: FAMILY MEDICINE

## 2020-03-05 RX ORDER — RIVASTIGMINE TARTRATE 3 MG/1
CAPSULE ORAL
Qty: 180 CAPSULE | Refills: 1 | Status: SHIPPED | OUTPATIENT
Start: 2020-03-05 | End: 2020-07-08 | Stop reason: SDUPTHER

## 2020-03-05 RX ORDER — OMEPRAZOLE 20 MG/1
20 CAPSULE, DELAYED RELEASE ORAL DAILY
Qty: 90 CAPSULE | Refills: 3 | Status: SHIPPED | OUTPATIENT
Start: 2020-03-05 | End: 2020-11-02 | Stop reason: SDUPTHER

## 2020-03-05 ASSESSMENT — PATIENT HEALTH QUESTIONNAIRE - PHQ9
2. FEELING DOWN, DEPRESSED OR HOPELESS: 0
SUM OF ALL RESPONSES TO PHQ QUESTIONS 1-9: 0
SUM OF ALL RESPONSES TO PHQ9 QUESTIONS 1 & 2: 0
SUM OF ALL RESPONSES TO PHQ QUESTIONS 1-9: 0
1. LITTLE INTEREST OR PLEASURE IN DOING THINGS: 0

## 2020-03-05 ASSESSMENT — ENCOUNTER SYMPTOMS: SHORTNESS OF BREATH: 0

## 2020-03-05 NOTE — PROGRESS NOTES
History:   Procedure Laterality Date    AORTIC VALVE REPLACEMENT  8/30/10    23mm benito-lozano    APPENDECTOMY      BLADDER SUSPENSION      CATARACT REMOVAL WITH IMPLANT Bilateral 2002    CHOLECYSTECTOMY      EYE SURGERY  04/2014    Left eye laser surgery    HEMORRHOID SURGERY      KNEE ARTHROPLASTY      left    OTHER SURGICAL HISTORY  9/14/10    sternal rewiring    REFRACTIVE SURGERY      left    SKIN CANCER EXCISION      TONSILLECTOMY AND ADENOIDECTOMY         Family History   Problem Relation Age of Onset    Cancer Father     Hypertension Mother     Stroke Mother     COPD Brother     Parkinsonism Brother        Social History     Tobacco Use    Smoking status: Never Smoker    Smokeless tobacco: Never Used   Substance Use Topics    Alcohol use: No      Current Outpatient Medications   Medication Sig Dispense Refill    rivastigmine (EXELON) 3 MG capsule TAKE ONE CAPSULE BY MOUTH TWICE DAILY 180 capsule 1    omeprazole (PRILOSEC) 20 MG delayed release capsule Take 1 capsule by mouth Daily 90 capsule 3    levothyroxine (SYNTHROID) 75 MCG tablet TAKE ONE TABLET BY MOUTH EVERY DAY 90 tablet 0    hydrALAZINE (APRESOLINE) 25 MG tablet Take 1 tablet by mouth 2 times daily 180 tablet 1    spironolactone (ALDACTONE) 25 MG tablet take ONE-HALF tablet by MOUTH ONCE daily 45 tablet 1    Arformoterol Tartrate (BROVANA) 15 MCG/2ML NEBU inhale contents of 1 vial IN NEBULIZER twice a day 120 mL 5    bumetanide (BUMEX) 1 MG tablet take ONE tablet by MOUTH ONCE daily on tuesday, thursday, saturday, and sunday, then 1 tablet twice daily on mon, wed, and fri 130 tablet 1    simvastatin (ZOCOR) 20 MG tablet TAKE ONE TABLET BY MOUTH AT BEDTIME 90 tablet 3    FEROSUL 325 (65 Fe) MG tablet TAKE ONE TABLET BY MOUTH EVERY DAY 90 tablet 3    citalopram (CELEXA) 10 MG tablet take 1/2 tablet by mouth once daily 90 tablet 3    metoprolol tartrate (LOPRESSOR) 25 MG tablet take 1 tablet by mouth twice a day Normocephalic. Neck:      Musculoskeletal: Neck supple. Cardiovascular:      Rate and Rhythm: Normal rate and regular rhythm. Pulmonary:      Effort: Pulmonary effort is normal. No respiratory distress. Breath sounds: Rhonchi (mild diffusely) present. No wheezing. Lymphadenopathy:      Cervical: No cervical adenopathy. Skin:     Findings: Bruising (thinning skin with slight under) present. Neurological:      Mental Status: She is alert. using walker and ambulating well      Assessment:      1. Memory deficit    2. Acquired hypothyroidism    3. Essential hypertension    4. Severe aortic stenosis    5. Cough    6. COPD, severity to be determined ([address]    7. Chronic combined systolic and diastolic congestive heart failure ([address]    8. Mood disorder (HCC)    9. Paroxysmal atrial fibrillation ([address]    10. Easy bruising             Plan:     Patient Instructions   Reviewed thinning skin tearing/bruising more easily      Orders Placed This Encounter   Procedures    TSH without Reflex     Standing Status:   Future     Standing Expiration Date:   3/5/2021    Creatinine, Serum     Standing Status:   Future     Standing Expiration Date:   3/5/2021    Electrolyte Panel     Standing Status:   Future     Standing Expiration Date:   3/5/2021    CBC Auto Differential     Standing Status:   Future     Standing Expiration Date:   3/5/2021     Orders Placed This Encounter   Medications    rivastigmine (EXELON) 3 MG capsule     Sig: TAKE ONE CAPSULE BY MOUTH TWICE DAILY     Dispense:  180 capsule     Refill:  1    omeprazole (PRILOSEC) 20 MG delayed release capsule     Sig: Take 1 capsule by mouth Daily     Dispense:  90 capsule     Refill:  3     Will call when refill needed at new lower dose of daily      Return in about 4 months (around 7/15/2020) for HTN, thyroid, memory issues. Discussed use, benefit, and side effects of prescribed medications. All patient questions answered.   Pt voiced understanding. Instructed to continue current medications, diet and exercise. Patient agreed with treatment plan. Follow up as directed.      Electronically signedby Luana Ugalde MD on 3/5/2020

## 2020-04-07 RX ORDER — ARFORMOTEROL TARTRATE 15 UG/2ML
SOLUTION RESPIRATORY (INHALATION)
Qty: 360 ML | Refills: 3 | Status: SHIPPED | OUTPATIENT
Start: 2020-04-07 | End: 2021-03-25

## 2020-04-07 RX ORDER — BUDESONIDE 0.5 MG/2ML
1 INHALANT ORAL 2 TIMES DAILY
Qty: 180 AMPULE | Refills: 3 | Status: SHIPPED | OUTPATIENT
Start: 2020-04-07 | End: 2020-09-10

## 2020-05-18 RX ORDER — SPIRONOLACTONE 25 MG/1
TABLET ORAL
Qty: 45 TABLET | Refills: 1 | Status: SHIPPED | OUTPATIENT
Start: 2020-05-18 | End: 2020-11-11

## 2020-05-18 NOTE — TELEPHONE ENCOUNTER
[facility] is requesting a refill on the following medication(s):  Requested Prescriptions     Pending Prescriptions Disp Refills    spironolactone (ALDACTONE) 25 MG tablet [Pharmacy Med Name: spironolactone 25 mg tablet] 45 tablet 1     Sig: TAKE 1/2 TABLET BY MOUTH EVERY DAY       Last Visit Date (If Applicable):  8/7/1898    Next Visit Date:    7/8/2020

## 2020-06-26 LAB
ANION GAP SERPL CALCULATED.3IONS-SCNC: 14 MEQ/L (ref 5–13)
BASOPHILS ABSOLUTE: 0.1 10'3/UL (ref 0.1–0.2)
BASOPHILS RELATIVE PERCENT: 0.8 % (ref 0–1.7)
CHLORIDE BLD-SCNC: 102 MMOL/L (ref 98–107)
CO2: 26 MMOL/L (ref 23–31)
CREAT SERPL-MCNC: 1.1 MG/DL (ref 0.57–1.11)
EOSINOPHILS ABSOLUTE: 0.2 10'3/UL (ref 0–0.4)
EOSINOPHILS RELATIVE PERCENT: 2.8 % (ref 0–6.4)
HCT VFR BLD CALC: 39.2 % (ref 34.6–44.1)
HEMOGLOBIN: 13 G/DL (ref 11.7–14.9)
LYMPHOCYTES ABSOLUTE: 1.4 10'3/UL (ref 0.5–3.5)
LYMPHOCYTES RELATIVE PERCENT: 23.3 % (ref 17.4–45.9)
MCH RBC QN AUTO: 33.1 PG (ref 27.8–33.2)
MCHC RBC AUTO-ENTMCNC: 33.2 G/DL (ref 32.7–34.8)
MCV RBC AUTO: 99.7 FL (ref 83–97.4)
MONOCYTES ABSOLUTE: 0.6 10'3/UL (ref 0.2–0.8)
MONOCYTES RELATIVE PERCENT: 10.3 % (ref 4.4–12)
NEUTROPHILS %: 62.8 % (ref 41.2–72.1)
NEUTROPHILS: 3.8 10'3/UL (ref 1.5–5.6)
PDW BLD-RTO: 11.7 % (ref 12.2–15.8)
PLATELET # BLD: 250 10'3/UL (ref 122–359)
PMV BLD AUTO: 10.8 FL (ref 7.6–10.6)
POTASSIUM SERPL-SCNC: 4.2 MMOL/L (ref 3.5–5.1)
RBC # BLD: 3.93 10'6/UL (ref 3.85–4.88)
SODIUM BLD-SCNC: 142 MMOL/L (ref 136–145)
TSH SERPL DL<=0.05 MIU/L-ACNC: 1.02 UIU/ML (ref 0.36–3.74)
WBC: 6.1 10'3/UL (ref 3.2–9.3)

## 2020-06-29 ENCOUNTER — TELEPHONE (OUTPATIENT)
Dept: FAMILY MEDICINE CLINIC | Age: 85
End: 2020-06-29

## 2020-07-08 ENCOUNTER — OFFICE VISIT (OUTPATIENT)
Dept: FAMILY MEDICINE CLINIC | Age: 85
End: 2020-07-08
Payer: MEDICARE

## 2020-07-08 VITALS
WEIGHT: 142.2 LBS | DIASTOLIC BLOOD PRESSURE: 64 MMHG | HEIGHT: 56 IN | SYSTOLIC BLOOD PRESSURE: 116 MMHG | HEART RATE: 58 BPM | OXYGEN SATURATION: 92 % | BODY MASS INDEX: 31.99 KG/M2

## 2020-07-08 PROCEDURE — G8427 DOCREV CUR MEDS BY ELIG CLIN: HCPCS | Performed by: FAMILY MEDICINE

## 2020-07-08 PROCEDURE — 1036F TOBACCO NON-USER: CPT | Performed by: FAMILY MEDICINE

## 2020-07-08 PROCEDURE — 4040F PNEUMOC VAC/ADMIN/RCVD: CPT | Performed by: FAMILY MEDICINE

## 2020-07-08 PROCEDURE — 99214 OFFICE O/P EST MOD 30 MIN: CPT | Performed by: FAMILY MEDICINE

## 2020-07-08 PROCEDURE — 1123F ACP DISCUSS/DSCN MKR DOCD: CPT | Performed by: FAMILY MEDICINE

## 2020-07-08 PROCEDURE — 99211 OFF/OP EST MAY X REQ PHY/QHP: CPT

## 2020-07-08 PROCEDURE — 1090F PRES/ABSN URINE INCON ASSESS: CPT | Performed by: FAMILY MEDICINE

## 2020-07-08 PROCEDURE — G8417 CALC BMI ABV UP PARAM F/U: HCPCS | Performed by: FAMILY MEDICINE

## 2020-07-08 RX ORDER — RIVASTIGMINE TARTRATE 4.5 MG/1
CAPSULE ORAL
Qty: 180 CAPSULE | Refills: 1 | Status: SHIPPED | OUTPATIENT
Start: 2020-07-08 | End: 2020-12-30

## 2020-07-08 ASSESSMENT — ENCOUNTER SYMPTOMS
COUGH: 0
SHORTNESS OF BREATH: 0
WHEEZING: 0
ABDOMINAL PAIN: 1

## 2020-07-08 NOTE — PROGRESS NOTES
[address] [facility]  Dept: [phone]  Dept [address] [place] [address] Annamarie Schwarz is a 80 y.o. female who presents today for her medical conditions/complaints as noted below. Chief Complaint   Patient presents with    3 Month Follow-Up    Hypertension       HPI:     HPI  Pt here for follow up visit and HTN  Slight increase in fatigue and unsteadiness, still using walker. Some stomach issues with nausea until using BR then resolves. Bowels move every day after prune juice. Seeing pulmonary    Memory doing fair, long term memory more active. Prior to Visit Medications    Medication Sig Taking?  Authorizing Provider   spironolactone (ALDACTONE) 25 MG tablet TAKE 1/2 TABLET BY MOUTH EVERY DAY Yes Magdi Burroughs MD   metoprolol tartrate (LOPRESSOR) 25 MG tablet TAKE ONE TABLET BY MOUTH TWICE DAILY Yes Merlin Burks MD   Arformoterol Tartrate (BROVANA) 15 MCG/2ML NEBU inhale contents of 1 vial IN NEBULIZER twice a day Yes Magdi Burroughs MD   budesonide (PULMICORT) 0.5 MG/2ML nebulizer suspension Take 2 mLs by nebulization 2 times daily Yes Magdi Burroughs MD   rivastigmine (EXELON) 3 MG capsule TAKE ONE CAPSULE BY MOUTH TWICE DAILY Yes Magdi Burroughs MD   omeprazole (PRILOSEC) 20 MG delayed release capsule Take 1 capsule by mouth Daily Yes Magdi Burroughs MD   levothyroxine (SYNTHROID) 75 MCG tablet TAKE ONE TABLET BY MOUTH EVERY DAY Yes Magdi Burroughs MD   hydrALAZINE (APRESOLINE) 25 MG tablet Take 1 tablet by mouth 2 times daily Yes Magdi Burroughs MD   bumetanide (BUMEX) 1 MG tablet take ONE tablet by MOUTH ONCE daily on tuesday, thursday, saturday, and sunday, then 1 tablet twice daily on mon, wed, and fri Yes Magdi Burroughs MD   simvastatin (ZOCOR) 20 MG tablet TAKE ONE TABLET BY MOUTH AT BEDTIME Yes Magdi Burroughs MD   FEROSUL 325 (65 Fe) MG tablet Brayan Anderson Yes Mgadi Burroughs MD   citalopram (CELEXA) 10 MG tablet take 1/2 tablet by mouth once daily Yes Chaparrita Robin MD   traZODone (DESYREL) 50 MG tablet take 1/2 tablet by mouth at bedtime Yes Chaparrita Robin MD   Handicap [facility] Pt with Aortic Stenosis I35.0 and Chonic respiratory disease J96.1 Yes Chaparrita Robin MD   loratadine (CLARITIN) 10 MG tablet Take 10 mg by mouth daily. Yes Historical Provider, MD   aspirin 81 MG chewable tablet Take 81 mg by mouth daily. Yes Historical Provider, MD   acetaminophen (TYLENOL) 325 MG tablet Take 650 mg by mouth nightly.    Yes Historical Provider, MD       Allergies   Allergen Reactions    Albuterol     Celebrex [Celecoxib]     Miralax [Polyethylene Glycol] Nausea Only    Sulfa Antibiotics        Past Medical History:   Diagnosis Date    Anemia     with negative gastrointestinal workup    Anxiety     Aspiration pneumonia (HCC)     Atrial fib/flutter, transient     Basal cell carcinoma of skin     CAD (coronary artery disease)     CHF (congestive heart failure) (HCC)     Chronic constipation     COPD, severity to be determined ([facility]     Cough     Severe, unremitting - Post-viral cough syndrome    Depression     Diverticulosis, acute     Epistaxis     GERD (gastroesophageal reflux disease)     H/O tracheostomy     for prolonged respiratory failure after aortic valve replacement    Hypercholesterolemia     Hypertension     Hypothyroid     Hypothyroidism     Left ventricular diastolic dysfunction     Mitral regurgitation     Obesity     Osteopenia     Peptic ulcer     Postnasal drip     without evidence for chronic sinusitis    Reactive airway disease     Respiratory failure (HCC)     Chronic respiratory failure with hypoxia    Ringworm     Recurrent    Severe aortic stenosis 08/30/2010    23mm benito-lozano AVR, post aortic valve replacement    Severe aortic stenosis     Shortness of breath     Sleep apnea     Sternal wound dehiscence     related to coughing, post rewiring in 2010 Past Surgical History:   Procedure Laterality Date    AORTIC VALVE REPLACEMENT  8/30/10    23mm benito-lozano    APPENDECTOMY      BLADDER SUSPENSION      CATARACT REMOVAL WITH IMPLANT Bilateral 2002    CHOLECYSTECTOMY      EYE SURGERY  04/2014    Left eye laser surgery    HEMORRHOID SURGERY      KNEE ARTHROPLASTY      left    OTHER SURGICAL HISTORY  9/14/10    sternal rewiring    REFRACTIVE SURGERY      left    SKIN CANCER EXCISION      TONSILLECTOMY AND ADENOIDECTOMY       Social History     Socioeconomic History    Marital status:      Spouse name: Not on file    Number of children: Not on file    Years of education: Not on file    Highest education level: Not on file   Occupational History    Occupation: retired   Social Needs    Financial resource strain: Not on file    Food insecurity     Worry: Not on file     Inability: Not on file   [employer] needs     Medical: Not on file     Non-medical: Not on file   Tobacco Use    Smoking status: Never Smoker    Smokeless tobacco: Never Used   Substance and Sexual Activity    Alcohol use: No    Drug use: No    Sexual activity: Not on file   Lifestyle    Physical activity     Days per week: Not on file     Minutes per session: Not on file    Stress: Not on file   Relationships    Social connections     Talks on phone: Not on file     Gets together: Not on file     Attends [employer] service: Not on file     Active member of club or organization: Not on file     Attends meetings of clubs or organizations: Not on file     Relationship status: Not on file    Intimate partner violence     Fear of current or ex partner: Not on file     Emotionally abused: Not on file     Physically abused: Not on file     Forced sexual activity: Not on file   Other Topics Concern    Not on file   Social History Narrative    Not on file     Family History   Problem Relation Age of Onset    Cancer Father     Hypertension Mother     Stroke Psychiatric:         Thought Content: Thought content normal.         Judgment: Judgment normal.         Assessment:      Diagnosis Orders   1. Essential hypertension     2. Severe aortic stenosis     3. Memory deficit     4. Chronic respiratory failure with hypoxia (HCC)     5. Acquired hypothyroidism     6. Easy bruising       No results found for this visit on 07/08/20.    :     Return in about 4 months (around 11/8/2020) for htn, valvular heart disease. .  There are no Patient Instructions on file for this visit. No orders of the defined types were placed in this encounter.       Electronically signed by Nichole Anton MD on 7/8/2020 at1:36 PM

## 2020-08-25 ENCOUNTER — TELEPHONE (OUTPATIENT)
Dept: FAMILY MEDICINE CLINIC | Age: 85
End: 2020-08-25

## 2020-08-25 ENCOUNTER — OFFICE VISIT (OUTPATIENT)
Dept: PULMONOLOGY | Age: 85
End: 2020-08-25
Payer: MEDICARE

## 2020-08-25 VITALS
DIASTOLIC BLOOD PRESSURE: 56 MMHG | HEART RATE: 45 BPM | TEMPERATURE: 98.1 F | SYSTOLIC BLOOD PRESSURE: 121 MMHG | OXYGEN SATURATION: 94 %

## 2020-08-25 PROCEDURE — 1123F ACP DISCUSS/DSCN MKR DOCD: CPT | Performed by: INTERNAL MEDICINE

## 2020-08-25 PROCEDURE — 1090F PRES/ABSN URINE INCON ASSESS: CPT | Performed by: INTERNAL MEDICINE

## 2020-08-25 PROCEDURE — G8427 DOCREV CUR MEDS BY ELIG CLIN: HCPCS | Performed by: INTERNAL MEDICINE

## 2020-08-25 PROCEDURE — 99213 OFFICE O/P EST LOW 20 MIN: CPT | Performed by: INTERNAL MEDICINE

## 2020-08-25 PROCEDURE — 4040F PNEUMOC VAC/ADMIN/RCVD: CPT | Performed by: INTERNAL MEDICINE

## 2020-08-25 PROCEDURE — G8417 CALC BMI ABV UP PARAM F/U: HCPCS | Performed by: INTERNAL MEDICINE

## 2020-08-25 PROCEDURE — 1036F TOBACCO NON-USER: CPT | Performed by: INTERNAL MEDICINE

## 2020-08-25 PROCEDURE — G8926 SPIRO NO PERF OR DOC: HCPCS | Performed by: INTERNAL MEDICINE

## 2020-08-25 PROCEDURE — 3023F SPIROM DOC REV: CPT | Performed by: INTERNAL MEDICINE

## 2020-08-25 ASSESSMENT — ENCOUNTER SYMPTOMS
SHORTNESS OF BREATH: 1
WHEEZING: 1
GASTROINTESTINAL NEGATIVE: 1
EYES NEGATIVE: 1
BACK PAIN: 1

## 2020-08-25 NOTE — PROGRESS NOTES
Skin:     General: Skin is warm and dry. Neurological:      Mental Status: She is alert and oriented to person, place, and time. Wt Readings from Last 3 Encounters:   07/08/20 142 lb 3.2 oz (64.5 kg)   03/05/20 142 lb (64.4 kg)   12/09/19 143 lb (64.9 kg)          Results for orders placed or performed in visit on 06/26/20   CBC with Differential   Result Value Ref Range    WBC 6.1 3.2 - 9.3 10'3/uL    RBC 3.93 3.85 - 4.88 10'6/uL    Hemoglobin 13.0 11.7 - 14.9 g/dL    Hematocrit 39.2 34.6 - 44.1 %    MCV 99.7 (H) 83.0 - 97.4 fL    MCH 33.1 27.8 - 33.2 pg    MCHC 33.2 32.7 - 34.8 g/dL    RDW 11.7 (L) 12.2 - 15.8 %    Platelets 492 484 - 040 10'3/uL    MPV 10.8 (H) 7.6 - 10.6 fL    Neutrophils % 62.8 41.2 - 72.1 %    Lymphocytes % 23.3 17.4 - 45.9 %    Monocytes % 10.3 4.4 - 12.0 %    Eosinophils % 2.8 0.0 - 6.4 %    Basophils % 0.8 0.0 - 1.7 %    Neutrophils 3.8 1.5 - 5.6 10'3/uL    Lymphocytes Absolute 1.4 0.5 - 3.5 10'3/uL    Monocytes Absolute 0.6 0.2 - 0.8 10'3/uL    Eosinophils Absolute 0.2 0.0 - 0.4 10'3/uL    Basophils Absolute 0.1 0.1 - 0.2 10'3/uL   Creatinine   Result Value Ref Range    CREATININE 1.10 0.57 - 1.11 mg/dL   ELECTROLYTES   Result Value Ref Range    Sodium 142 136 - 145 mmol/L    Potassium 4.2 3.5 - 5.1 mmol/L    Chloride 102 98 - 107 mmol/L    CO2 26 23 - 31 mmol/L    Anion Gap 14 (H) 5 - 13 mEq/L   TSH 3RD GENERATION   Result Value Ref Range    TSH 1.016 0.358 - 3.74 uIU/mL       Assessment:         1. Chronic respiratory failure with hypoxia (HCC)    2. Severe aortic stenosis    3. Chronic combined systolic and diastolic congestive heart failure ([facility]    4. COPD, severity to be determined ([facility]          Plan:      1. Continue oxygen. 2. Flu shot in fall. 3. Discussed strategies to mitigate COVID risk. 4. Return in 1 year.      Electronically signed by Saeid Reyes DO on 8/25/2020at 11:40 AM

## 2020-08-25 NOTE — TELEPHONE ENCOUNTER
Pt had apt with Dr. Amira Agosto today, HR was at 39, wants to know if she should adjust her metoprolol.

## 2020-09-02 RX ORDER — LEVOTHYROXINE SODIUM 0.07 MG/1
TABLET ORAL
Qty: 90 TABLET | Refills: 1 | Status: SHIPPED | OUTPATIENT
Start: 2020-09-02 | End: 2021-02-15

## 2020-09-02 NOTE — TELEPHONE ENCOUNTER
[facility] is requesting a refill on the following medication(s):  Requested Prescriptions     Pending Prescriptions Disp Refills    levothyroxine (SYNTHROID) 75 MCG tablet [Pharmacy Med Name: levothyroxine 75 mcg tablet] 90 tablet 1     Sig: TAKE ONE TABLET BY MOUTH EVERY DAY       Last Visit Date (If Applicable):  6/3/9347    Next Visit Date:    11/2/2020

## 2020-09-10 RX ORDER — BUDESONIDE 0.5 MG/2ML
INHALANT ORAL
Qty: 180 ML | Refills: 3 | Status: SHIPPED | OUTPATIENT
Start: 2020-09-10 | End: 2021-03-25

## 2020-09-10 NOTE — TELEPHONE ENCOUNTER
[facility] is requesting a refill on the following medication(s):  Requested Prescriptions     Pending Prescriptions Disp Refills    budesonide (PULMICORT) 0.5 MG/2ML nebulizer suspension [Pharmacy Med Name: budesonide 0.5 mg/2 mL suspension for nebulization] 180 mL 3     Sig: USE ONE vial in NEBULIZER TWICE DAILY       Last Visit Date (If Applicable):  Visit date not found    Next Visit Date:    Visit date not found

## 2020-09-29 RX ORDER — HYDRALAZINE HYDROCHLORIDE 25 MG/1
TABLET, FILM COATED ORAL
Qty: 180 TABLET | Refills: 1 | Status: SHIPPED | OUTPATIENT
Start: 2020-09-29 | End: 2021-07-05

## 2020-09-29 NOTE — TELEPHONE ENCOUNTER
[facility] is requesting a refill on the following medication(s):  Requested Prescriptions     Pending Prescriptions Disp Refills    hydrALAZINE (APRESOLINE) 25 MG tablet [Pharmacy Med Name: hydralazine 25 mg tablet] 180 tablet 1     Sig: TAKE ONE TABLET BY MOUTH TWICE DAILY       Last Visit Date (If Applicable):  0/6/2358    Next Visit Date:    11/2/2020

## 2020-10-06 RX ORDER — BUMETANIDE 1 MG/1
TABLET ORAL
Qty: 130 TABLET | Refills: 1 | Status: SHIPPED | OUTPATIENT
Start: 2020-10-06 | End: 2021-04-30

## 2020-10-06 NOTE — TELEPHONE ENCOUNTER
[address] [facility] is requesting a refill on the following medication(s):  Requested Prescriptions     Pending Prescriptions Disp Refills    bumetanide (BUMEX) 1 MG tablet [Pharmacy Med Name: bumetanide 1 mg tablet] 130 tablet 1     Sig: take ONE tablet by MOUTH ONCE daily on tuesday, thursday, saturday, and sunday, then 1 tablet twice daily on mon, wed, and fri       Last Visit Date (If Applicable):  7/9/5611    Next Visit Date:    11/2/2020

## 2020-11-02 ENCOUNTER — OFFICE VISIT (OUTPATIENT)
Dept: FAMILY MEDICINE CLINIC | Age: 85
End: 2020-11-02
Payer: MEDICARE

## 2020-11-02 VITALS
DIASTOLIC BLOOD PRESSURE: 76 MMHG | WEIGHT: 142.8 LBS | SYSTOLIC BLOOD PRESSURE: 122 MMHG | OXYGEN SATURATION: 96 % | BODY MASS INDEX: 30.81 KG/M2 | HEIGHT: 57 IN | HEART RATE: 55 BPM

## 2020-11-02 PROBLEM — E03.9 ACQUIRED HYPOTHYROIDISM: Status: ACTIVE | Noted: 2020-11-02

## 2020-11-02 PROCEDURE — G8926 SPIRO NO PERF OR DOC: HCPCS | Performed by: FAMILY MEDICINE

## 2020-11-02 PROCEDURE — G8417 CALC BMI ABV UP PARAM F/U: HCPCS | Performed by: FAMILY MEDICINE

## 2020-11-02 PROCEDURE — 99211 OFF/OP EST MAY X REQ PHY/QHP: CPT

## 2020-11-02 PROCEDURE — 3023F SPIROM DOC REV: CPT | Performed by: FAMILY MEDICINE

## 2020-11-02 PROCEDURE — 4040F PNEUMOC VAC/ADMIN/RCVD: CPT | Performed by: FAMILY MEDICINE

## 2020-11-02 PROCEDURE — G8427 DOCREV CUR MEDS BY ELIG CLIN: HCPCS | Performed by: FAMILY MEDICINE

## 2020-11-02 PROCEDURE — 1123F ACP DISCUSS/DSCN MKR DOCD: CPT | Performed by: FAMILY MEDICINE

## 2020-11-02 PROCEDURE — 99214 OFFICE O/P EST MOD 30 MIN: CPT | Performed by: FAMILY MEDICINE

## 2020-11-02 PROCEDURE — 1036F TOBACCO NON-USER: CPT | Performed by: FAMILY MEDICINE

## 2020-11-02 PROCEDURE — 1090F PRES/ABSN URINE INCON ASSESS: CPT | Performed by: FAMILY MEDICINE

## 2020-11-02 PROCEDURE — G8484 FLU IMMUNIZE NO ADMIN: HCPCS | Performed by: FAMILY MEDICINE

## 2020-11-02 RX ORDER — OMEPRAZOLE 20 MG/1
20 CAPSULE, DELAYED RELEASE ORAL 2 TIMES DAILY
Qty: 180 CAPSULE | Refills: 3 | Status: SHIPPED | OUTPATIENT
Start: 2020-11-02 | End: 2021-07-20

## 2020-11-02 ASSESSMENT — ENCOUNTER SYMPTOMS
ABDOMINAL PAIN: 1
DIARRHEA: 0
COUGH: 0
SHORTNESS OF BREATH: 0
CONSTIPATION: 0
WHEEZING: 0

## 2020-11-02 NOTE — PROGRESS NOTES
[address] [facility]  Dept: [phone]  Dept [address] [facility] Viki Bradshaw is a 80 y.o. female who presents today for her medical conditions/complaints as noted below. Chief Complaint   Patient presents with    Hypertension       HPI:     HPI  Here for follow up of heart failure, HTN, Hyperlipidemia, Hypothyroid and COPD  Taking all medications regularly  No side effects noted    other complaint currently stomach issues after breakfast past few days also following Dr Jack Rahman, qualifying for O2 at night. Had follow up recently. Plans to [facility] at some point this winter. No issues with complaint of breathing, still walks with walker and oxygen for exertion and nighttime oxygen also. Using injections on shoulder. Prior to Visit Medications    Medication Sig Taking?  Authorizing Provider   citalopram (CELEXA) 10 MG tablet TAKE 1/2 TABLET BY MOUTH Leo Wilson MD   traZODone (DESYREL) 50 MG tablet TAKE 1/2 TABLET BY MOUTH AT bedtime Yes Deja Finch MD   bumetanide (BUMEX) 1 MG tablet take ONE tablet by MOUTH ONCE daily on tuesday, thursday, saturday, and sunday, then 1 tablet twice daily on mon, wed, and fri Yes Deja Finch MD   hydrALAZINE (APRESOLINE) 25 MG tablet TAKE ONE TABLET BY MOUTH TWICE DAILY Yes Deja Finch MD   budesonide (PULMICORT) 0.5 MG/2ML nebulizer suspension USE ONE vial in Hannah Fishman MD   levothyroxine (SYNTHROID) 75 MCG tablet TAKE ONE TABLET BY Spencer Fuentes Yes Deja Finch MD   rivastigmine (EXELON) 4.5 MG capsule TAKE ONE CAPSULE BY MOUTH TWICE DAILY Yes Deja Finch MD   spironolactone (ALDACTONE) 25 MG tablet TAKE 1/2 TABLET BY MOUTH EVERY DAY Yes Deja Finch MD   metoprolol tartrate (LOPRESSOR) 25 MG tablet TAKE ONE TABLET BY MOUTH TWICE DAILY Yes Merlin Burks MD   Arformoterol Tartrate (BROVANA) 15 MCG/2ML NEBU inhale contents of 1 vial IN NEBULIZER twice a day Yes Xochilt Arciniega MD   omeprazole (PRILOSEC) 20 MG delayed release capsule Take 1 capsule by mouth Daily Yes Xochilt Arciniega MD   simvastatin (ZOCOR) 20 MG tablet TAKE ONE TABLET BY MOUTH AT BEDTIME Yes Xochilt Arciniega MD   Handicap [facility] Pt with Aortic Stenosis I35.0 and Chonic respiratory disease J96.1 Yes Xochilt Arciniega MD   loratadine (CLARITIN) 10 MG tablet Take 10 mg by mouth daily. Yes Historical Provider, MD   aspirin 81 MG chewable tablet Take 81 mg by mouth daily. Yes Historical Provider, MD   acetaminophen (TYLENOL) 325 MG tablet Take 650 mg by mouth nightly.    Yes Historical Provider, MD       Allergies   Allergen Reactions    Albuterol     Celebrex [Celecoxib]     Miralax [Polyethylene Glycol] Nausea Only    Sulfa Antibiotics        Past Medical History:   Diagnosis Date    Anemia     with negative gastrointestinal workup    Anxiety     Aspiration pneumonia (HCC)     Atrial fib/flutter, transient     Basal cell carcinoma of skin     CAD (coronary artery disease)     CHF (congestive heart failure) (HCC)     Chronic constipation     COPD, severity to be determined ([facility]     Cough     Severe, unremitting - Post-viral cough syndrome    Depression     Diverticulosis, acute     Epistaxis     GERD (gastroesophageal reflux disease)     H/O tracheostomy     for prolonged respiratory failure after aortic valve replacement    Hypercholesterolemia     Hypertension     Hypothyroid     Hypothyroidism     Left ventricular diastolic dysfunction     Mitral regurgitation     Obesity     Osteopenia     Peptic ulcer     Postnasal drip     without evidence for chronic sinusitis    Reactive airway disease     Respiratory failure (HCC)     Chronic respiratory failure with hypoxia    Ringworm     Recurrent    Severe aortic stenosis 08/30/2010    23mm benito-lozano AVR, post aortic valve replacement    Severe aortic stenosis     Shortness of breath     Problem Relation Age of Onset    Cancer Father     Hypertension Mother     Stroke Mother     COPD Brother     Parkinsonism Brother        Subjective:      Review of Systems   Constitutional: Negative for fatigue and fever. Respiratory: Negative for cough, shortness of breath and wheezing. Nothing out of her usual.   Cardiovascular: Negative for chest pain, palpitations and leg swelling. Gastrointestinal: Positive for abdominal pain. Negative for constipation and diarrhea. Is eating breakfast usually a bowl of cereal every day and after she says her stomach hurts. Takes the omeprazole right away in the morning as well. Genitourinary: Negative for frequency and urgency. Neurological: Negative for dizziness and headaches. Objective:     /76   Pulse 55   Ht 4' 8.5\" (1.435 m)   Wt 142 lb 12.8 oz (64.8 kg)   SpO2 96%   BMI 31.45 kg/m²     Physical Exam  Vitals signs reviewed. Constitutional:       General: She is not in acute distress. Appearance: She is well-developed. HENT:      Head: Atraumatic. Eyes:      Conjunctiva/sclera: Conjunctivae normal.   Neck:      Musculoskeletal: Neck supple. Thyroid: No thyromegaly. Vascular: No carotid bruit. Cardiovascular:      Rate and Rhythm: Normal rate and regular rhythm. Heart sounds: No murmur. Pulmonary:      Effort: Pulmonary effort is normal.      Breath sounds: Rhonchi (minimal) present. Abdominal:      General: Bowel sounds are normal.      Palpations: Abdomen is soft. Musculoskeletal:         General: Tenderness (left lower ext anterior noted with swelling check moderate) present. No swelling (BLE). Neurological:      Mental Status: She is alert and oriented to person, place, and time. Weight stable    Assessment:      Diagnosis Orders   1. Essential hypertension     2. Chronic combined systolic and diastolic congestive heart failure ([address]     3.  Chronic respiratory failure with hypoxia ([facility]     4. COPD, severity to be determined ([facility]     5. Acquired hypothyroidism     6. Mood disorder ([facility]     7. BMI 31.0-31.9,adult     8. Cough     9. Epigastric pain  omeprazole (PRILOSEC) 20 MG delayed release capsule     No results found for this visit on 11/02/20.    :     Return in about 6 months (around 5/2/2021) for HTN, COPD, CHF, heart failure. There are no Patient Instructions on file for this visit. No orders of the defined types were placed in this encounter.       Electronically signed by Rony Lee MD on 11/2/2020 at8:55 PM

## 2020-11-11 RX ORDER — SPIRONOLACTONE 25 MG/1
TABLET ORAL
Qty: 45 TABLET | Refills: 1 | Status: SHIPPED | OUTPATIENT
Start: 2020-11-11 | End: 2021-05-03

## 2020-11-11 NOTE — TELEPHONE ENCOUNTER
[address] [facility] is requesting a refill on the following medication(s):  Requested Prescriptions     Pending Prescriptions Disp Refills    metoprolol tartrate (LOPRESSOR) 25 MG tablet [Pharmacy Med Name: metoprolol tartrate 25 mg tablet] 180 tablet 1     Sig: TAKE ONE TABLET BY MOUTH TWICE DAILY       Last Visit Date (If Applicable):  57/1/4690    Next Visit Date:    Visit date not found

## 2020-11-11 NOTE — TELEPHONE ENCOUNTER
[facility] is requesting a refill on the following medication(s):  Requested Prescriptions     Pending Prescriptions Disp Refills    spironolactone (ALDACTONE) 25 MG tablet [Pharmacy Med Name: spironolactone 25 mg tablet] 45 tablet 1     Sig: TAKE 1/2 TABLET BY MOUTH EVERY DAY       Last Visit Date (If Applicable):  67/0/8357    Next Visit Date:    Visit date not found

## 2020-12-09 RX ORDER — SIMVASTATIN 20 MG
TABLET ORAL
Qty: 90 TABLET | Refills: 3 | Status: SHIPPED | OUTPATIENT
Start: 2020-12-09 | End: 2021-10-27

## 2020-12-09 NOTE — TELEPHONE ENCOUNTER
[facility] is requesting a refill on the following medication(s):  Requested Prescriptions     Pending Prescriptions Disp Refills    simvastatin (ZOCOR) 20 MG tablet [Pharmacy Med Name: simvastatin 20 mg tablet] 90 tablet 3     Sig: TAKE ONE TABLET BY MOUTH AT BEDTIME       Last Visit Date (If Applicable):  09/1/8921    Next Visit Date:    Visit date not found

## 2020-12-30 RX ORDER — RIVASTIGMINE TARTRATE 4.5 MG/1
CAPSULE ORAL
Qty: 180 CAPSULE | Refills: 1 | Status: SHIPPED | OUTPATIENT
Start: 2020-12-30 | End: 2021-05-25 | Stop reason: SDUPTHER

## 2021-02-17 ENCOUNTER — TELEPHONE (OUTPATIENT)
Dept: FAMILY MEDICINE CLINIC | Age: 86
End: 2021-02-17

## 2021-02-19 NOTE — TELEPHONE ENCOUNTER
Daughter called back and said for physical therapy, states Suzie Mckeon seems to be more confused about getting up in the morning and things she is supposed to do. Daughter states shes not doing much cause shes not getting out of the house and getting enough exercise and help getting dressed maybe 2-3 times a week. Daughter called back stating just helps managing her morning routine care such as taking medications, getting dressed, and eating.

## 2021-02-22 NOTE — TELEPHONE ENCOUNTER
May have home health evaluation/treatment as needed. Also encourage checking patients oxygen/pulse ox when having more issues to ensure this is not happening due to low oxygen levels.   Thanks

## 2021-02-25 ENCOUNTER — TELEPHONE (OUTPATIENT)
Dept: FAMILY MEDICINE CLINIC | Age: 86
End: 2021-02-25

## 2021-02-25 DIAGNOSIS — E03.9 ACQUIRED HYPOTHYROIDISM: Primary | ICD-10-CM

## 2021-02-25 DIAGNOSIS — I10 ESSENTIAL HYPERTENSION: ICD-10-CM

## 2021-02-25 NOTE — TELEPHONE ENCOUNTER
May just get TSH with electrolytes and Cr.   Also encourage pt to have covid vaccine as soon as able   Thanks

## 2021-02-25 NOTE — TELEPHONE ENCOUNTER
pts daughter called asking which labs you would to have Gale get done, she is due for a TSH, but wants to know if there are any others you want to put in for when she gets these labs drawn.

## 2021-03-10 LAB
ANION GAP SERPL CALCULATED.3IONS-SCNC: 10 MEQ/L (ref 5–13)
CHLORIDE BLD-SCNC: 101 MMOL/L (ref 98–107)
CO2: 32 MMOL/L (ref 23–31)
CREAT SERPL-MCNC: 1.01 MG/DL (ref 0.57–1.11)
POTASSIUM SERPL-SCNC: 4.1 MMOL/L (ref 3.5–5.1)
SODIUM BLD-SCNC: 143 MMOL/L (ref 136–145)
TSH SERPL DL<=0.05 MIU/L-ACNC: 0.83 UIU/ML (ref 0.36–3.74)

## 2021-04-30 DIAGNOSIS — I35.0 SEVERE AORTIC STENOSIS: ICD-10-CM

## 2021-04-30 RX ORDER — BUMETANIDE 1 MG/1
TABLET ORAL
Qty: 130 TABLET | Refills: 1 | Status: SHIPPED | OUTPATIENT
Start: 2021-04-30 | End: 2021-10-13

## 2021-04-30 NOTE — TELEPHONE ENCOUNTER
Carey Wilde is requesting a refill on the following medication(s):  Requested Prescriptions     Pending Prescriptions Disp Refills    bumetanide (BUMEX) 1 MG tablet [Pharmacy Med Name: bumetanide 1 mg tablet] 130 tablet 1     Sig: take 1 tablet by mouth once daily on tuesday, thursday, saturday, and sunday, then 1 tablet twice daily on mon, wed, and fri       Last Visit Date (If Applicable):  76/2/9283    Next Visit Date:    5/25/2021

## 2021-05-03 DIAGNOSIS — I35.0 SEVERE AORTIC STENOSIS: ICD-10-CM

## 2021-05-03 DIAGNOSIS — I10 ESSENTIAL HYPERTENSION: ICD-10-CM

## 2021-05-03 RX ORDER — SPIRONOLACTONE 25 MG/1
TABLET ORAL
Qty: 45 TABLET | Refills: 1 | Status: SHIPPED | OUTPATIENT
Start: 2021-05-03 | End: 2021-10-18 | Stop reason: SDUPTHER

## 2021-05-03 NOTE — TELEPHONE ENCOUNTER
Shante Woodward is requesting a refill on the following medication(s):  Requested Prescriptions     Pending Prescriptions Disp Refills    spironolactone (ALDACTONE) 25 MG tablet [Pharmacy Med Name: spironolactone 25 mg tablet] 45 tablet 1     Sig: TAKE 1/2 TABLET BY MOUTH EVERY DAY       Last Visit Date (If Applicable):  27/6/1569    Next Visit Date:    5/25/2021

## 2021-05-25 ENCOUNTER — OFFICE VISIT (OUTPATIENT)
Dept: FAMILY MEDICINE CLINIC | Age: 86
End: 2021-05-25
Payer: MEDICARE

## 2021-05-25 VITALS
HEART RATE: 77 BPM | OXYGEN SATURATION: 94 % | DIASTOLIC BLOOD PRESSURE: 84 MMHG | WEIGHT: 135 LBS | SYSTOLIC BLOOD PRESSURE: 124 MMHG | BODY MASS INDEX: 29.73 KG/M2

## 2021-05-25 DIAGNOSIS — R41.3 MEMORY DEFICIT: ICD-10-CM

## 2021-05-25 DIAGNOSIS — I48.0 PAROXYSMAL ATRIAL FIBRILLATION (HCC): ICD-10-CM

## 2021-05-25 DIAGNOSIS — F39 MOOD DISORDER (HCC): ICD-10-CM

## 2021-05-25 DIAGNOSIS — I50.42 CHRONIC COMBINED SYSTOLIC AND DIASTOLIC CONGESTIVE HEART FAILURE (HCC): ICD-10-CM

## 2021-05-25 DIAGNOSIS — J44.9 COPD, SEVERITY TO BE DETERMINED (HCC): ICD-10-CM

## 2021-05-25 DIAGNOSIS — M79.604 PAIN IN BOTH LOWER EXTREMITIES: ICD-10-CM

## 2021-05-25 DIAGNOSIS — M79.605 PAIN IN BOTH LOWER EXTREMITIES: ICD-10-CM

## 2021-05-25 DIAGNOSIS — Z00.00 ROUTINE GENERAL MEDICAL EXAMINATION AT A HEALTH CARE FACILITY: Primary | ICD-10-CM

## 2021-05-25 PROCEDURE — G0439 PPPS, SUBSEQ VISIT: HCPCS | Performed by: FAMILY MEDICINE

## 2021-05-25 PROCEDURE — 4040F PNEUMOC VAC/ADMIN/RCVD: CPT | Performed by: FAMILY MEDICINE

## 2021-05-25 PROCEDURE — 1123F ACP DISCUSS/DSCN MKR DOCD: CPT | Performed by: FAMILY MEDICINE

## 2021-05-25 PROCEDURE — 99212 OFFICE O/P EST SF 10 MIN: CPT | Performed by: FAMILY MEDICINE

## 2021-05-25 RX ORDER — RIVASTIGMINE TARTRATE 6 MG/1
CAPSULE ORAL
Qty: 180 CAPSULE | Refills: 3 | Status: SHIPPED | OUTPATIENT
Start: 2021-05-25

## 2021-05-25 ASSESSMENT — PATIENT HEALTH QUESTIONNAIRE - PHQ9
SUM OF ALL RESPONSES TO PHQ QUESTIONS 1-9: 0
1. LITTLE INTEREST OR PLEASURE IN DOING THINGS: 0
SUM OF ALL RESPONSES TO PHQ QUESTIONS 1-9: 0

## 2021-05-25 ASSESSMENT — LIFESTYLE VARIABLES: HOW OFTEN DO YOU HAVE A DRINK CONTAINING ALCOHOL: 0

## 2021-05-25 NOTE — PATIENT INSTRUCTIONS
Personalized Preventive Plan for Susan Camarillo - 5/25/2021  Medicare offers a range of preventive health benefits. Some of the tests and screenings are paid in full while other may be subject to a deductible, co-insurance, and/or copay. Some of these benefits include a comprehensive review of your medical history including lifestyle, illnesses that may run in your family, and various assessments and screenings as appropriate. After reviewing your medical record and screening and assessments performed today your provider may have ordered immunizations, labs, imaging, and/or referrals for you. A list of these orders (if applicable) as well as your Preventive Care list are included within your After Visit Summary for your review. Other Preventive Recommendations:    · A preventive eye exam performed by an eye specialist is recommended every 1-2 years to screen for glaucoma; cataracts, macular degeneration, and other eye disorders. · A preventive dental visit is recommended every 6 months. · Try to get at least 150 minutes of exercise per week or 10,000 steps per day on a pedometer . · Order or download the FREE \"Exercise & Physical Activity: Your Everyday Guide\" from [employer]. Call 5-711.511.8989 or search [employer] online. · You need 9790-9647 mg of calcium and 9773-0550 IU of vitamin D per day. It is possible to meet your calcium requirement with diet alone, but a vitamin D supplement is usually necessary to meet this goal.  · When exposed to the sun, use a sunscreen that protects against both UVA and UVB radiation with an SPF of 30 or greater. Reapply every 2 to 3 hours or after sweating, drying off with a towel, or swimming. · Always wear a seat belt when traveling in a car. Always wear a helmet when riding a bicycle or motorcycle.

## 2021-05-25 NOTE — PROGRESS NOTES
[place] [address] [address]  Dept: [phone]  Dept Fax: [phone]    Marlon Hernandez is a 80 y.o. female who presents today for her medical conditions/complaints as noted below. Marlon Hernandez is c/o of Medicare AWV      HPI:     HPI  Pt here for annual medicare wellness visit. Controlled HTN, hypothyroid, hyperlipidemia and COPD, aortic stenosis  Taking medications regularly with family monitoring  Only using daytime oxygen when active. Had been walking outside until got hot.   Bruising noted on legs, tender per daughter/caregiver      BP Readings from Last 3 Encounters:   05/25/21 124/84   11/02/20 122/76   08/25/20 (!) 121/56          (goal 120/80)    Past Medical History:   Diagnosis Date    Anemia     with negative gastrointestinal workup    Anxiety     Aspiration pneumonia (HCC)     Atrial fib/flutter, transient     Basal cell carcinoma of skin     CAD (coronary artery disease)     CHF (congestive heart failure) (HCC)     Chronic constipation     COPD, severity to be determined ([facility]     Cough     Severe, unremitting - Post-viral cough syndrome    Depression     Diverticulosis, acute     Epistaxis     GERD (gastroesophageal reflux disease)     H/O tracheostomy     for prolonged respiratory failure after aortic valve replacement    Hypercholesterolemia     Hypertension     Hypothyroid     Hypothyroidism     Left ventricular diastolic dysfunction     Mitral regurgitation     Obesity     Osteopenia     Peptic ulcer     Postnasal drip     without evidence for chronic sinusitis    Reactive airway disease     Respiratory failure (HCC)     Chronic respiratory failure with hypoxia    Ringworm     Recurrent    Severe aortic stenosis 08/30/2010    23mm benito-lozano AVR, post aortic valve replacement    Severe aortic stenosis     Shortness of breath     Sleep apnea     Sternal wound dehiscence     related to coughing, post rewiring in 2010      Past Surgical History:   Procedure Laterality Date    AORTIC VALVE REPLACEMENT  8/30/10    23mm benito-lozano    APPENDECTOMY      BLADDER SUSPENSION      CATARACT REMOVAL WITH IMPLANT Bilateral 2002    CHOLECYSTECTOMY      EYE SURGERY  04/2014    Left eye laser surgery    HEMORRHOID SURGERY      KNEE ARTHROPLASTY      left    OTHER SURGICAL HISTORY  9/14/10    sternal rewiring    REFRACTIVE SURGERY      left    SKIN CANCER EXCISION      TONSILLECTOMY AND ADENOIDECTOMY         Family History   Problem Relation Age of Onset    Cancer Father     Hypertension Mother     Stroke Mother     COPD Brother     Parkinsonism Brother        Social History     Tobacco Use    Smoking status: Never Smoker    Smokeless tobacco: Never Used   Substance Use Topics    Alcohol use: No      Current Outpatient Medications   Medication Sig Dispense Refill    rivastigmine (EXELON) 6 MG capsule TAKE ONE CAPSULE BY MOUTH TWICE DAILY 180 capsule 3    spironolactone (ALDACTONE) 25 MG tablet TAKE 1/2 TABLET BY MOUTH EVERY DAY 45 tablet 1    bumetanide (BUMEX) 1 MG tablet take 1 tablet by mouth once daily on tuesday, thursday, saturday, and sunday, then 1 tablet twice daily on mon, wed, and fri 130 tablet 1    levothyroxine (SYNTHROID) 75 MCG tablet Take 1 tablet by mouth Daily 90 tablet 1    budesonide (PULMICORT) 0.5 MG/2ML nebulizer suspension use 1 vial in NEBULIZER TWICE DAILY 180 mL 3    Arformoterol Tartrate (BROVANA) 15 MCG/2ML NEBU inhale contents of ONE vial in NEBULIZER TWICE DAILY 360 mL 3    simvastatin (ZOCOR) 20 MG tablet TAKE ONE TABLET BY MOUTH AT BEDTIME 90 tablet 3    metoprolol tartrate (LOPRESSOR) 25 MG tablet TAKE ONE TABLET BY MOUTH TWICE DAILY 180 tablet 1    omeprazole (PRILOSEC) 20 MG delayed release capsule Take 1 capsule by mouth 2 times daily 180 capsule 3    citalopram (CELEXA) 10 MG tablet TAKE 1/2 TABLET BY MOUTH EVERY DAY 45 tablet 2    if correct)     1    Copy intersecting pentagons                (indicate 1 if correct)     1    Write a sentence                (indicate 1 if correct)     1    Repeat \"no ifs, ands, or buts\"                (indicate 1 if correct)     1    Follow 3-stage command                (indicate # done correctly) 1                                            ------------  TOTAL SCORE   (max = 30)                  23      REFERENCE INFORMATION FOR THE CLINICIAN:    \"Low\" score    = 0-23  \"Normal\" score = 24-30    December 2019 MMSE was at 19      Objective:     /84 (Site: Left Upper Arm)   Pulse 77   Wt 135 lb (61.2 kg)   SpO2 94%   BMI 29.73 kg/m²   Physical Exam      Assessment:      1. Routine general medical examination at a health care facility    2. COPD, severity to be determined ([address]    3. Pain in both lower extremities    4. Memory deficit    5. Chronic combined systolic and diastolic congestive heart failure ([address]    6. Mood disorder (HCC)    7. Paroxysmal atrial fibrillation ([address]    8. BMI 29.0-29.9,adult                     Plan:     Patient Instructions     Personalized Preventive Plan for Marlon Hernandez - 5/25/2021  Medicare offers a range of preventive health benefits. Some of the tests and screenings are paid in full while other may be subject to a deductible, co-insurance, and/or copay. Some of these benefits include a comprehensive review of your medical history including lifestyle, illnesses that may run in your family, and various assessments and screenings as appropriate. After reviewing your medical record and screening and assessments performed today your provider may have ordered immunizations, labs, imaging, and/or referrals for you. A list of these orders (if applicable) as well as your Preventive Care list are included within your After Visit Summary for your review.     Other Preventive Recommendations:    · A preventive eye exam performed by an eye specialist is recommended every 1-2 years to screen for glaucoma; cataracts, macular degeneration, and other eye disorders. · A preventive dental visit is recommended every 6 months. · Try to get at least 150 minutes of exercise per week or 10,000 steps per day on a pedometer . · Order or download the FREE \"Exercise & Physical Activity: Your Everyday Guide\" from [employer]. Call 0-863.357.2579 or search [employer] online. · You need 8779-5861 mg of calcium and 3982-0036 IU of vitamin D per day. It is possible to meet your calcium requirement with diet alone, but a vitamin D supplement is usually necessary to meet this goal.  · When exposed to the sun, use a sunscreen that protects against both UVA and UVB radiation with an SPF of 30 or greater. Reapply every 2 to 3 hours or after sweating, drying off with a towel, or swimming. · Always wear a seat belt when traveling in a car. Always wear a helmet when riding a bicycle or motorcycle. Orders Placed This Encounter   Procedures    US DUP LOWER EXTREMITY LEFT ANILA     Standing Status:   Future     Standing Expiration Date:   5/25/2022     Order Specific Question:   Reason for exam:     Answer:   calf tenderness    US DUP LOWER EXTREMITY RIGHT ANILA     Standing Status:   Future     Standing Expiration Date:   5/25/2022     Orders Placed This Encounter   Medications    rivastigmine (EXELON) 6 MG capsule     Sig: TAKE ONE CAPSULE BY MOUTH TWICE DAILY     Dispense:  180 capsule     Refill:  3        Return in about 6 months (around 11/25/2021) for aortic stenosis, CHF, COPD, CKD, afib. Discussed use, benefit, and side effects of prescribed medications. All patient questions answered. Pt voiced understanding. Reviewed health maintenance. Instructed to continue current medications, diet and exercise. Patient agreed with treatment plan. Follow up as directed.      Electronically signedby Yessenia Aviles MD on 5/25/2021           Medicare Annual Wellness Visit  Name: Ivana Oneill Date: 2021   MRN: [id number] Sex: Female   Age: 80 y.o. Ethnicity: Non-/Non    : 1925 Race: Renee Jenkins is here for Medicare AWV    Screenings for behavioral, psychosocial and functional/safety risks, and cognitive dysfunction are all negative except as indicated below. These results, as well as other patient data from the [address] [facility] form, are documented in Flowsheets linked to this Encounter. Allergies   Allergen Reactions    Albuterol     Celebrex [Celecoxib]     Miralax [Polyethylene Glycol] Nausea Only    Sulfa Antibiotics          Prior to Visit Medications    Medication Sig Taking?  Authorizing Provider   rivastigmine (EXELON) 6 MG capsule TAKE ONE CAPSULE BY MOUTH TWICE DAILY Yes Magui Davis MD   spironolactone (ALDACTONE) 25 MG tablet TAKE 1/2 TABLET BY MOUTH EVERY DAY Yes Magui Davis MD   bumetanide (BUMEX) 1 MG tablet take 1 tablet by mouth once daily on tuesday, thursday, saturday, and , then 1 tablet twice daily on mon, wed, and fri Yes Magui Davis MD   levothyroxine (SYNTHROID) 75 MCG tablet Take 1 tablet by mouth Daily Yes Magui Davis MD   budesonide (PULMICORT) 0.5 MG/2ML nebulizer suspension use 1 vial in NEBULIZER TWICE DAILY Yes Magui Davis MD   Arformoterol Tartrate (BROVANA) 15 MCG/2ML NEBU inhale contents of ONE vial in Dao Ricks MD   simvastatin (ZOCOR) 20 MG tablet TAKE ONE TABLET BY MOUTH AT BEDTIME Yes Magui Davis MD   metoprolol tartrate (LOPRESSOR) 25 MG tablet TAKE ONE TABLET BY MOUTH TWICE DAILY Yes Magui Davis MD   omeprazole (PRILOSEC) 20 MG delayed release capsule Take 1 capsule by mouth 2 times daily Yes Magui Davis MD   citalopram (CELEXA) 10 MG tablet TAKE 1/2 Aliciandolyn Ashley Yes Magui Davis MD   traZODone (DESYREL) 50 MG tablet TAKE 1/2 TABLET BY MOUTH AT bedtime Yes Magui Davis MD hydrALAZINE (APRESOLINE) 25 MG tablet TAKE ONE TABLET BY MOUTH TWICE DAILY Yes Beatriz Pike MD   Handicap [facility] [place] with Aortic Stenosis I35.0 and Chonic respiratory disease J96.1 Yes Beatriz Pike MD   loratadine (CLARITIN) 10 MG tablet Take 10 mg by mouth daily. Yes Historical Provider, MD   aspirin 81 MG chewable tablet Take 81 mg by mouth daily. Yes Historical Provider, MD   acetaminophen (TYLENOL) 325 MG tablet Take 650 mg by mouth nightly.    Yes Historical Provider, MD         Past Medical History:   Diagnosis Date    Anemia     with negative gastrointestinal workup    Anxiety     [facility] [address]     Atrial fib/flutter, transient     Basal cell carcinoma of skin     CAD (coronary artery disease)     [facility]congest[facility]) [facility])     Chronic constipation     COPD, severity to be determined ([facility] [address]     Cough     Severe, unremitting - Post-viral cough syndrome    Depression     Diverticulosis, acute     Epistaxis     GERD (gastroesophageal reflux disease)     H/O tracheostomy     for prolonged respiratory failure after aortic valve replacement    Hypercholesterolemia     Hypertension     Hypothyroid     Hypothyroidism     Left ventricular diastolic dysfunction     Mitral regurgitation     Obesity     Osteopenia     Peptic ulcer     Postnasal drip     without evidence for chronic sinusitis    Reactive airway disease     Respiratory failure (HCC)     Chronic respiratory failure with hypoxia    Ringworm     Recurrent    Severe aortic stenosis 08/30/2010    23mm benito-lozano AVR, post aortic valve replacement    Severe aortic stenosis     Shortness of breath     Sleep apnea     Sternal wound dehiscence     related to coughing, post rewiring in 2010       Past Surgical History:   Procedure Laterality Date    AORTIC VALVE REPLACEMENT  8/30/10    23mm benito-lozano    APPENDECTOMY      BLADDER SUSPENSION      CATARACT REMOVAL WITH IMPLANT Bilateral 2002    CHOLECYSTECTOMY      EYE SURGERY  04/2014    Left eye laser surgery    HEMORRHOID SURGERY      KNEE ARTHROPLASTY      left    OTHER SURGICAL HISTORY  9/14/10    sternal rewiring    REFRACTIVE SURGERY      left    SKIN CANCER EXCISION      TONSILLECTOMY AND ADENOIDECTOMY           Family History   Problem Relation Age of Onset    Cancer Father     Hypertension Mother     Stroke Mother     COPD Brother     Parkinsonism Brother        CareTeam (Including outside providers/suppliers regularly involved in providing care):   Patient Care Team:  Alfredo Prieto MD as PCP - Nathan Roldan MD as PCP - [facility] Empaneled Provider  Radha Kirby MD as Referring Physician  Norm Huggins MD as Surgeon (Cardiothoracic Surgery)  Hugo Awan DO as Consulting Physician (Pulmonology)  JOSE Brock - CNP as Nurse Practitioner (Pulmonology)    Wt Readings from Last 3 Encounters:   05/25/21 135 lb (61.2 kg)   11/02/20 142 lb 12.8 oz (64.8 kg)   07/08/20 142 lb 3.2 oz (64.5 kg)     Vitals:    05/25/21 1348   BP: 124/84   Site: Left Upper Arm   Pulse: 77   SpO2: 94%   Weight: 135 lb (61.2 kg)     Body mass index is 29.73 kg/m². Based upon direct observation of the patient, evaluation of cognition reveals remote memory intact, recent memory impaired. A&O x 3, oxygen in place currently  Heart RRR   Lungs Course rhonchi throughout  Abd good BS soft NT  Ext no edema, lower leg calf tenderness bilaterally without cord or erythema noted  Neck supple, no thyromegaly, no adenopathy      Patient's complete Health Risk Assessment and screening values have been reviewed and are found in Flowsheets. The following problems were reviewed today and where indicated follow up appointments were made and/or referrals ordered. Positive Risk Factor Screenings with Interventions:      Cognitive:   Words recalled: 0 Words Recalled  Clock Drawing Test (CDT) Score: (!) Abnormal  Total Score Interpretation: Positive Mini-Cog  Did the patient refuse to take the cognition test?: No  Cognitive Impairment Interventions:  · Medication adjusted- raised exelon 6 mg bid         General Health and ACP:  General  In general, how would you say your health is?: Good  In the past 7 days, have you experienced any of the following? New or Increased Pain, New or Increased Fatigue, Loneliness, Social Isolation, Stress or Anger?: (!) New or Increased Fatigue  Do you get the social and emotional support that you need?: Yes  Do you have a Living Will?: Yes  Advance Directives     Power of  Living Will ACP-Advance Directive ACP-Power of     Not on File [place] on 05/23/17 Filed [address] [facility] Risk Interventions:  · no big change in fatigue noted.     Health Habits/Nutrition:  Health Habits/Nutrition  Do you exercise for at least 20 minutes 2-3 times per week?: Yes  Have you lost any weight without trying in the past 3 months?: (!) Yes  Do you eat only one meal per day?: No  Have you seen the dentist within the past year?: N/A - wear dentures     Health Habits/Nutrition Interventions:  · Nutritional issues:  weight down 7 #, consider protein shakes twice daily    Hearing/Vision:  No exam data present  Hearing/Vision  Do you or your family notice any trouble with your hearing that hasn't been managed with hearing aids?: (!) Yes  Do you have difficulty driving, watching TV, or doing any of your daily activities because of your eyesight?: No (Does not drive)  Have you had an eye exam within the past year?: (!) No  Hearing/Vision Interventions:  · Hearing concerns:  seeing specialist    Safety:  Safety  Do you have working smoke detectors?: Yes  Have all throw rugs been removed or fastened?: (!) No  Do you have non-slip mats or surfaces in all bathtubs/showers?: Yes  Do all of your stairways have a railing or banister?: Yes  Are your doorways, halls and stairs free of clutter?: Yes  Do you always fasten Recommended screening schedule for the next 5-10 years is provided to the patient in written form: see Patient Marek Vasques was seen today for medicare awv. Diagnoses and all orders for this visit:    Routine general medical examination at a health care facility    COPD, severity to be determined ([facility] [address]    Pain in both lower extremities  -     US DUP LOWER EXTREMITY LEFT ANILA; Future  -     US DUP LOWER EXTREMITY RIGHT ANILA; Future    Memory deficit  -     rivastigmine (EXELON) 6 MG capsule; TAKE ONE CAPSULE BY MOUTH TWICE DAILY    Chronic combined systolic and diastolic congestive heart failure (HCC)    Mood disorder (HCC)    Paroxysmal atrial fibrillation (HCC)    BMI 29.0-29.9,adult           Discussion regarding evaluation of reason for weight loss and possible finding something in need of treatment. Pt nor daughter inclined to further evaluation to which I concur.

## 2021-07-01 DIAGNOSIS — I35.0 SEVERE AORTIC STENOSIS: ICD-10-CM

## 2021-07-01 DIAGNOSIS — F39 MOOD DISORDER (HCC): ICD-10-CM

## 2021-07-01 DIAGNOSIS — I10 ESSENTIAL HYPERTENSION: ICD-10-CM

## 2021-07-01 NOTE — TELEPHONE ENCOUNTER
Sandro Saenz is requesting a refill on the following medication(s):  Requested Prescriptions     Pending Prescriptions Disp Refills    traZODone (DESYREL) 50 MG tablet [Pharmacy Med Name: trazodone 50 mg tablet] 45 tablet 2     Sig: TAKE 1/2 TABLET BY MOUTH at bedtime    hydrALAZINE (APRESOLINE) 25 MG tablet [Pharmacy Med Name: hydralazine 25 mg tablet] 180 tablet 1     Sig: TAKE ONE TABLET BY MOUTH TWICE DAILY    citalopram (CELEXA) 10 MG tablet [Pharmacy Med Name: citalopram 10 mg tablet] 45 tablet 2     Sig: TAKE 1/2 TABLET BY MOUTH EVERY DAY       Last Visit Date (If Applicable):  2/34/5245    Next Visit Date:    11/15/2021

## 2021-07-05 RX ORDER — HYDRALAZINE HYDROCHLORIDE 25 MG/1
TABLET, FILM COATED ORAL
Qty: 180 TABLET | Refills: 1 | Status: SHIPPED | OUTPATIENT
Start: 2021-07-05 | End: 2021-11-03 | Stop reason: SDUPTHER

## 2021-07-05 RX ORDER — CITALOPRAM 10 MG/1
TABLET ORAL
Qty: 45 TABLET | Refills: 2 | Status: SHIPPED | OUTPATIENT
Start: 2021-07-05

## 2021-07-05 RX ORDER — TRAZODONE HYDROCHLORIDE 50 MG/1
TABLET ORAL
Qty: 45 TABLET | Refills: 2 | Status: SHIPPED | OUTPATIENT
Start: 2021-07-05 | End: 2021-10-27

## 2021-07-20 DIAGNOSIS — R10.13 EPIGASTRIC PAIN: ICD-10-CM

## 2021-07-20 RX ORDER — OMEPRAZOLE 20 MG/1
CAPSULE, DELAYED RELEASE ORAL
Qty: 90 CAPSULE | Refills: 3 | Status: SHIPPED | OUTPATIENT
Start: 2021-07-20

## 2021-07-20 NOTE — TELEPHONE ENCOUNTER
Cherelle Menchaca is requesting a refill on the following medication(s):  Requested Prescriptions     Pending Prescriptions Disp Refills    omeprazole (PRILOSEC) 20 MG delayed release capsule [Pharmacy Med Name: omeprazole 20 mg capsule,delayed release] 90 capsule 3     Sig: TAKE ONE CAPSULE BY MOUTH EVERY DAY       Last Visit Date (If Applicable):  5/12/8682    Next Visit Date:    11/15/2021

## 2021-07-29 DIAGNOSIS — E03.9 ACQUIRED HYPOTHYROIDISM: ICD-10-CM

## 2021-07-29 RX ORDER — LEVOTHYROXINE SODIUM 0.07 MG/1
TABLET ORAL
Qty: 90 TABLET | Refills: 1 | Status: SHIPPED | OUTPATIENT
Start: 2021-07-29 | End: 2021-11-03 | Stop reason: SDUPTHER

## 2021-07-29 NOTE — TELEPHONE ENCOUNTER
Leonora Nichols is requesting a refill on the following medication(s):  Requested Prescriptions     Pending Prescriptions Disp Refills    levothyroxine (SYNTHROID) 75 MCG tablet [Pharmacy Med Name: levothyroxine 75 mcg tablet] 90 tablet 1     Sig: TAKE ONE TABLET BY MOUTH EVERY DAY       Last Visit Date (If Applicable):  2/96/0068    Next Visit Date:    11/15/2021

## 2021-08-13 ENCOUNTER — OFFICE VISIT (OUTPATIENT)
Dept: PULMONOLOGY | Age: 86
End: 2021-08-13
Payer: MEDICARE

## 2021-08-13 VITALS
TEMPERATURE: 98.7 F | HEIGHT: 56 IN | RESPIRATION RATE: 22 BRPM | OXYGEN SATURATION: 92 % | HEART RATE: 77 BPM | WEIGHT: 127.8 LBS | DIASTOLIC BLOOD PRESSURE: 90 MMHG | BODY MASS INDEX: 28.75 KG/M2 | SYSTOLIC BLOOD PRESSURE: 128 MMHG

## 2021-08-13 DIAGNOSIS — I35.0 SEVERE AORTIC STENOSIS: ICD-10-CM

## 2021-08-13 DIAGNOSIS — I50.42 CHRONIC COMBINED SYSTOLIC AND DIASTOLIC CONGESTIVE HEART FAILURE (HCC): ICD-10-CM

## 2021-08-13 DIAGNOSIS — J96.11 CHRONIC RESPIRATORY FAILURE WITH HYPOXIA (HCC): Primary | ICD-10-CM

## 2021-08-13 DIAGNOSIS — J44.9 COPD, SEVERITY TO BE DETERMINED (HCC): ICD-10-CM

## 2021-08-13 PROCEDURE — 1036F TOBACCO NON-USER: CPT | Performed by: INTERNAL MEDICINE

## 2021-08-13 PROCEDURE — 1090F PRES/ABSN URINE INCON ASSESS: CPT | Performed by: INTERNAL MEDICINE

## 2021-08-13 PROCEDURE — 1123F ACP DISCUSS/DSCN MKR DOCD: CPT | Performed by: INTERNAL MEDICINE

## 2021-08-13 PROCEDURE — G8427 DOCREV CUR MEDS BY ELIG CLIN: HCPCS | Performed by: INTERNAL MEDICINE

## 2021-08-13 PROCEDURE — G8417 CALC BMI ABV UP PARAM F/U: HCPCS | Performed by: INTERNAL MEDICINE

## 2021-08-13 PROCEDURE — 99213 OFFICE O/P EST LOW 20 MIN: CPT | Performed by: INTERNAL MEDICINE

## 2021-08-13 PROCEDURE — 3023F SPIROM DOC REV: CPT | Performed by: INTERNAL MEDICINE

## 2021-08-13 PROCEDURE — 4040F PNEUMOC VAC/ADMIN/RCVD: CPT | Performed by: INTERNAL MEDICINE

## 2021-08-13 PROCEDURE — G8926 SPIRO NO PERF OR DOC: HCPCS | Performed by: INTERNAL MEDICINE

## 2021-08-13 ASSESSMENT — ENCOUNTER SYMPTOMS
EYES NEGATIVE: 1
GASTROINTESTINAL NEGATIVE: 1
SHORTNESS OF BREATH: 1
BACK PAIN: 1

## 2021-08-13 NOTE — PROGRESS NOTES
Subjective:      Patient ID: Nereida Gonzalez is a 80 y.o. female being seen in my clinic for   Chief Complaint   Patient presents with    Restrictive Lung Disease       HPI  Follow-up visit for chronic respiratory failure on continuous oxygen. She is accompanied by her daughter who delgado in [place]. This year, the patient did not go to [place]. Rather, she stayed with her other daughter and son-in-law in the [facility] area. States that this daughter and her  do not believe in vaccines and apparently the patient did not get her vaccine until this past spring when her other daughter returned from [place]. No symptoms of Covid however. Patient remains on oxygen at least 18 hours a day. Uses it to sleep and also with exercise. Needs to be recertified. Denies cough or wheeze. Uses a wheeled walker. History of aortic stenosis post aortic valve replacement. Also combined systolic and diastolic heart failure. Review of Systems   Constitutional: Negative. HENT: Negative. Eyes: Negative. Respiratory: Positive for shortness of breath. Cardiovascular: Positive for leg swelling. Gastrointestinal: Negative. Musculoskeletal: Positive for back pain and gait problem. All other systems reviewed and are negative.       Objective:     Vitals:    08/13/21 1452 08/13/21 1519   BP: (!) 128/90  Comment: unable to obtain    Site: Left Upper Arm    Position: Sitting    Cuff Size: Large Adult    Pulse: 77    Resp: 22    Temp: 98.7 °F (37.1 °C)    TempSrc: Oral    SpO2: 98%  Comment: with o2 92%  Comment: without O2   Weight: 127 lb 12.8 oz (58 kg)    Height: 4' 8\" (1.422 m)      Current Outpatient Medications   Medication Sig Dispense Refill    levothyroxine (SYNTHROID) 75 MCG tablet TAKE ONE TABLET BY MOUTH EVERY DAY 90 tablet 1    omeprazole (PRILOSEC) 20 MG delayed release capsule TAKE ONE CAPSULE BY MOUTH EVERY DAY 90 capsule 3    traZODone (DESYREL) 50 MG tablet TAKE 1/2 TABLET BY MOUTH at bedtime 45 tablet 2    hydrALAZINE (APRESOLINE) 25 MG tablet TAKE ONE TABLET BY MOUTH TWICE DAILY 180 tablet 1    citalopram (CELEXA) 10 MG tablet TAKE 1/2 TABLET BY MOUTH EVERY DAY 45 tablet 2    rivastigmine (EXELON) 6 MG capsule TAKE ONE CAPSULE BY MOUTH TWICE DAILY 180 capsule 3    spironolactone (ALDACTONE) 25 MG tablet TAKE 1/2 TABLET BY MOUTH EVERY DAY 45 tablet 1    bumetanide (BUMEX) 1 MG tablet take 1 tablet by mouth once daily on tuesday, thursday, saturday, and sunday, then 1 tablet twice daily on mon, wed, and fri 130 tablet 1    budesonide (PULMICORT) 0.5 MG/2ML nebulizer suspension use 1 vial in NEBULIZER TWICE DAILY 180 mL 3    Arformoterol Tartrate (BROVANA) 15 MCG/2ML NEBU inhale contents of ONE vial in NEBULIZER TWICE DAILY 360 mL 3    simvastatin (ZOCOR) 20 MG tablet TAKE ONE TABLET BY MOUTH AT BEDTIME 90 tablet 3    metoprolol tartrate (LOPRESSOR) 25 MG tablet TAKE ONE TABLET BY MOUTH TWICE DAILY 180 tablet 1    Handicap Placard [facility] Pt with Aortic Stenosis I35.0 and Chonic respiratory disease J96.1 2 each 0    loratadine (CLARITIN) 10 MG tablet Take 10 mg by mouth daily.  aspirin 81 MG chewable tablet Take 81 mg by mouth daily.  acetaminophen (TYLENOL) 325 MG tablet Take 650 mg by mouth nightly. No current facility-administered medications for this visit. Physical Exam  Vitals and nursing note reviewed. Constitutional:       Appearance: She is well-developed. Comments: Elderly white female. Walks with a wheeled walker. HENT:      Head: Normocephalic. Mouth/Throat:      Pharynx: Oropharynx is clear. No oropharyngeal exudate. Eyes:      General: No scleral icterus. Conjunctiva/sclera: Conjunctivae normal.   Neck:      Thyroid: No thyromegaly. Vascular: No JVD. Trachea: No tracheal deviation. Cardiovascular:      Rate and Rhythm: Normal rate and regular rhythm. Heart sounds: Normal heart sounds. No murmur heard.    No gallop. Pulmonary:      Effort: Pulmonary effort is normal. No respiratory distress. Breath sounds: No wheezing or rales. Comments: No crackles  Chest:      Chest wall: No tenderness. Abdominal:      Palpations: Abdomen is soft. Tenderness: There is no abdominal tenderness. Musculoskeletal:      Cervical back: Neck supple. Right lower leg: Edema present. Left lower leg: Edema present. Comments: Trace   Lymphadenopathy:      Cervical: No cervical adenopathy. Skin:     General: Skin is warm and dry. Neurological:      Mental Status: She is alert and oriented to person, place, and time. Wt Readings from Last 3 Encounters:   08/13/21 127 lb 12.8 oz (58 kg)   05/25/21 135 lb (61.2 kg)   11/02/20 142 lb 12.8 oz (64.8 kg)     Results for orders placed or performed in visit on 03/10/21   Creatinine   Result Value Ref Range    CREATININE 1.01 0.57 - 1.11 mg/dL   ELECTROLYTES   Result Value Ref Range    Sodium 143 136 - 145 mmol/L    Potassium 4.1 3.5 - 5.1 mmol/L    Chloride 101 98 - 107 mmol/L    CO2 32 (H) 23 - 31 mmol/L    Anion Gap 10 5 - 13 mEq/L   TSH 3RD GENERATION   Result Value Ref Range    TSH 0.830 0.358 - 3.74 uIU/mL       :      1. Chronic respiratory failure with hypoxia (HCC)    2. Severe aortic stenosis    3. Chronic combined systolic and diastolic congestive heart failure ([address]    4.  COPD, severity to be determined [facility])      Patient Active Problem List   Diagnosis    Severe aortic stenosis    Chronic congestive heart failure (HCC)    Mitral regurgitation    Paroxysmal atrial fibrillation (HCC)    Peptic ulcer    Chronic pain of both shoulders    CKD (chronic kidney disease) stage 3, GFR 30-59 ml/min (HCC)    Essential hypertension    Mixed hyperlipidemia    Mood disorder (HCC)    COPD, severity to be determined ([address]    Chronic respiratory failure with hypoxia ([address]    Hypoxia    Memory deficit    BMI 31.0-31.9,adult    Acquired hypothyroidism Plan:      1. 6-minute walk test to qualify for oxygen. 2. Continue same. Discussed risks, benefits, and rationale of chronic oxygen therapy and she accepts. 3. Encouraged regular exercise. 4. Flu shot in fall. 5. Her primary care physician refills bronchodilators. 6. Return in 1 year. No orders of the defined types were placed in this encounter. Orders Placed This Encounter   Procedures    6 Minute Walk Test     Standing Status:   Future     Standing Expiration Date:   8/13/2022     Return in about 1 year (around 8/13/2022).        Electronically signed by Piter Abraham DO on 8/13/2021at 6:43 PM

## 2021-08-16 ENCOUNTER — OFFICE VISIT (OUTPATIENT)
Dept: PULMONOLOGY | Age: 86
End: 2021-08-16

## 2021-08-16 VITALS — WEIGHT: 127 LBS | OXYGEN SATURATION: 92 % | HEIGHT: 56 IN | HEART RATE: 75 BPM | BODY MASS INDEX: 28.57 KG/M2

## 2021-08-16 DIAGNOSIS — J96.11 CHRONIC RESPIRATORY FAILURE WITH HYPOXIA (HCC): Primary | ICD-10-CM

## 2021-08-16 NOTE — PROGRESS NOTES
[facility]  [facility]  [address] [facility] [address]    Oximetry Report  Testing Date: 08/16/21      Name: Keeley King    Age: 80 y.o. Gender: female   Diagnosis:   1. Chronic respiratory failure with hypoxia ([address]                                            Referring Physician: Richar Guillen    Weight: 127                                                    Height: 56 in    Smoke HX:  reports that she has never smoked. She has never used smokeless tobacco.                                                            Max - Target  Heart Rate 183 / 146  Inspired O2 SP02% Max Heart Rate Assist Device Activity Pace Distance Walked (ft) Time (min.)   R/A 94 75  rest      R/A 86 88 Walker  Walk  Slow  250 3   N/C-2 95 89 Walk  Walk  Slow  300 3   N/C-3          N/C          R/A= Roomed Air, NC = Oxygen by Nasal Cannula    Distance Walk Predicted Distance LLN** Predicted % Duration (min) Duration of Stops Sec Hernán Dyspnea Hernán Fatigue   [phone] 6  3 3   **LLN= Lower limits of normal **LLN= Lower limits of normal  (from [place] [facility] [facility] [address] [place] [address] of Respiratory and Critical Care Medicine;[id number])       Comments: The patient walked for 6 minutes at a slow normal pace. She walked for 250 ft on room air and her oxygen saturation 86%. She was started an oxygen at flow of 2 with a conserving device to keep her saturation above 90%. She  did exhibit signs of dyspnea and did complain. Patient had difficult time walking, stated her legs were getting weak.

## 2021-09-22 ENCOUNTER — NURSE TRIAGE (OUTPATIENT)
Dept: OTHER | Facility: CLINIC | Age: 86
End: 2021-09-22

## 2021-09-22 NOTE — TELEPHONE ENCOUNTER
Received call from [address]. at [facility] with Red Flag Complaint. Brief description of triage: Madan Fairbanks,     more disoriented and shakier than she was, leg injury 2 days ago, shin below the knee, above the ankle is black, and blue and is going into ankle and foot. REdness is above the ankle. Triage indicates for patient to see pcp today    Care advice provided, patient verbalizes understanding; denies any other questions or concerns; instructed to call back for any new or worsening symptoms. Writer provided warm transfer to April at [facility] for appointment scheduling. Attention Provider: Thank you for allowing me to participate in the care of your patient. The patient was connected to triage in response to information provided to the ECC/PSC. Please do not respond through this encounter as the response is not directed to a shared pool. Reason for Disposition   SEVERE pain (e.g., excruciating)    Answer Assessment - Initial Assessment Questions  1. MECHANISM: \"How did the injury happen? \" (e.g., twisting injury, direct blow)       Hit leg on something when she got up, unsure what.   2. ONSET: \"When did the injury happen? \" (Minutes or hours ago)       2 days ago  3. LOCATION: \"Where is the injury located? \"       Left leg, below the knee, on the shin and the ankle. 4. APPEARANCE of INJURY: \"What does the injury look like? \"       Redness is on the shin, black and blue is on the foot,     5. WEIGHT-BEARING: \"Can you put weight on that foot? \" \"Can you walk (four steps or more)? \"        She can do some weight bearing but it is difficult  6. SIZE: For cuts, bruises, or swelling, ask: \"How large is it? \" (e.g., inches or centimeters;  entire joint)       No opening, bruising is about 3 inches circular, all the way around   7. PAIN: \"Is there pain? \" If so, ask: \"How bad is the pain? \"    (e.g., Scale 1-10; or mild, moderate, severe)      Every time she moves she yells out   8.  TETANUS: For any breaks in the skin, ask: \"When was the last tetanus booster? \"      n/a  9. OTHER SYMPTOMS: \"Do you have any other symptoms? \"       More disoriented than usual, more difficulty walking. 10. PREGNANCY: \"Is there any chance you are pregnant? \" \"When was your last menstrual period? \"        n/a    Protocols used: ANKLE AND FOOT INJURY-ADULT-[place]

## 2021-09-28 DIAGNOSIS — J44.9 CHRONIC OBSTRUCTIVE PULMONARY DISEASE, UNSPECIFIED (HCC): ICD-10-CM

## 2021-09-28 RX ORDER — BUDESONIDE 0.5 MG/2ML
INHALANT ORAL
Qty: 180 ML | Refills: 3 | Status: SHIPPED | OUTPATIENT
Start: 2021-09-28 | End: 2021-11-03 | Stop reason: SDUPTHER

## 2021-09-28 NOTE — TELEPHONE ENCOUNTER
Juan Manuel Anthony is requesting a refill on the following medication(s):  Requested Prescriptions     Pending Prescriptions Disp Refills    budesonide (PULMICORT) 0.5 MG/2ML nebulizer suspension [Pharmacy Med Name: budesonide 0.5 mg/2 mL suspension for nebulization] 180 mL 3     Sig: use 1 vial in [place] [address]       Last Visit Date (If Applicable):  0/01/7481    Next Visit Date:    11/15/2021

## 2021-10-06 ENCOUNTER — OFFICE VISIT (OUTPATIENT)
Dept: FAMILY MEDICINE CLINIC | Age: 86
End: 2021-10-06
Payer: MEDICARE

## 2021-10-06 VITALS
WEIGHT: 130 LBS | SYSTOLIC BLOOD PRESSURE: 122 MMHG | HEART RATE: 95 BPM | OXYGEN SATURATION: 98 % | HEIGHT: 56 IN | DIASTOLIC BLOOD PRESSURE: 84 MMHG | BODY MASS INDEX: 29.25 KG/M2

## 2021-10-06 DIAGNOSIS — M79.662 PAIN AND SWELLING OF LEFT LOWER LEG: ICD-10-CM

## 2021-10-06 DIAGNOSIS — R25.1 TREMOR: ICD-10-CM

## 2021-10-06 DIAGNOSIS — G30.9 ALZHEIMER'S DISEASE, UNSPECIFIED (CODE) (HCC): ICD-10-CM

## 2021-10-06 DIAGNOSIS — R41.3 MEMORY DEFICIT: Primary | ICD-10-CM

## 2021-10-06 DIAGNOSIS — M79.89 PAIN AND SWELLING OF LEFT LOWER LEG: ICD-10-CM

## 2021-10-06 PROCEDURE — G8427 DOCREV CUR MEDS BY ELIG CLIN: HCPCS | Performed by: FAMILY MEDICINE

## 2021-10-06 PROCEDURE — 1123F ACP DISCUSS/DSCN MKR DOCD: CPT | Performed by: FAMILY MEDICINE

## 2021-10-06 PROCEDURE — 1036F TOBACCO NON-USER: CPT | Performed by: FAMILY MEDICINE

## 2021-10-06 PROCEDURE — 99214 OFFICE O/P EST MOD 30 MIN: CPT

## 2021-10-06 PROCEDURE — 1090F PRES/ABSN URINE INCON ASSESS: CPT | Performed by: FAMILY MEDICINE

## 2021-10-06 PROCEDURE — 99213 OFFICE O/P EST LOW 20 MIN: CPT | Performed by: FAMILY MEDICINE

## 2021-10-06 PROCEDURE — G8484 FLU IMMUNIZE NO ADMIN: HCPCS | Performed by: FAMILY MEDICINE

## 2021-10-06 PROCEDURE — G8417 CALC BMI ABV UP PARAM F/U: HCPCS | Performed by: FAMILY MEDICINE

## 2021-10-06 PROCEDURE — 4040F PNEUMOC VAC/ADMIN/RCVD: CPT | Performed by: FAMILY MEDICINE

## 2021-10-06 RX ORDER — MEMANTINE HYDROCHLORIDE 5 MG/1
5 TABLET ORAL 2 TIMES DAILY
Qty: 60 TABLET | Refills: 5 | Status: SHIPPED | OUTPATIENT
Start: 2021-10-06

## 2021-10-06 SDOH — ECONOMIC STABILITY: FOOD INSECURITY: WITHIN THE PAST 12 MONTHS, YOU WORRIED THAT YOUR FOOD WOULD RUN OUT BEFORE YOU GOT MONEY TO BUY MORE.: NEVER TRUE

## 2021-10-06 SDOH — ECONOMIC STABILITY: FOOD INSECURITY: WITHIN THE PAST 12 MONTHS, THE FOOD YOU BOUGHT JUST DIDN'T LAST AND YOU DIDN'T HAVE MONEY TO GET MORE.: NEVER TRUE

## 2021-10-06 ASSESSMENT — SOCIAL DETERMINANTS OF HEALTH (SDOH): HOW HARD IS IT FOR YOU TO PAY FOR THE VERY BASICS LIKE FOOD, HOUSING, MEDICAL CARE, AND HEATING?: NOT HARD AT ALL

## 2021-10-06 NOTE — PROGRESS NOTES
fibrillation ([address]     Peptic ulcer     Postnasal drip     without evidence for chronic sinusitis    Reactive airway disease     Respiratory failure (HCC)     Chronic respiratory failure with hypoxia    Ringworm     Recurrent    Severe aortic stenosis 08/30/2010    23mm benito-lozano AVR, post aortic valve replacement    Severe aortic stenosis     Shortness of breath     Sleep apnea     Sternal wound dehiscence     related to coughing, post rewiring in 2010      Past Surgical History:   Procedure Laterality Date    AORTIC VALVE REPLACEMENT  8/30/10    23mm benito-lozano    APPENDECTOMY      BLADDER SUSPENSION      CATARACT REMOVAL WITH IMPLANT Bilateral 2002    CHOLECYSTECTOMY      EYE SURGERY  04/2014    Left eye laser surgery    HEMORRHOID SURGERY      KNEE ARTHROPLASTY      left    OTHER SURGICAL HISTORY  9/14/10    sternal rewiring    REFRACTIVE SURGERY      left    SKIN CANCER EXCISION      TONSILLECTOMY AND ADENOIDECTOMY         Family History   Problem Relation Age of Onset    Cancer Father     Hypertension Mother     Stroke Mother     COPD Brother     Parkinsonism Brother        Social History     Tobacco Use    Smoking status: Never Smoker    Smokeless tobacco: Never Used   Substance Use Topics    Alcohol use: No      Prior to Visit Medications    Medication Sig Taking?  Authorizing Provider   budesonide (PULMICORT) 0.5 MG/2ML nebulizer suspension use 1 vial in NEBULIZER Geovany Carey MD   levothyroxine (SYNTHROID) 75 MCG tablet TAKE ONE TABLET BY MOUTH EVERY [place] MD   omeprazole (PRILOSEC) 20 MG delayed release capsule TAKE ONE Akash Pimentel MD   traZODone (DESYREL) 50 MG tablet TAKE 1/2 TABLET BY MOUTH at bedtime  Kimberley Srinivasan MD   hydrALAZINE (APRESOLINE) 25 MG tablet TAKE ONE TABLET BY MOUTH Geovany Carey MD   citalopram (CELEXA) 10 MG tablet TAKE 1/2 TABLET BY MOUTH EVERY DAY  Merlin Burks, MD   rivastigmine (EXELON) 6 MG capsule TAKE ONE CAPSULE BY MOUTH TWICE DAILY  Merlin Burks MD   spironolactone (ALDACTONE) 25 MG tablet TAKE 1/2 TABLET BY MOUTH EVERY DAY  Merlin Burks MD   bumetanide (BUMEX) 1 MG tablet take 1 tablet by mouth once daily on tuesday, thursday, saturday, and sunday, then 1 tablet twice daily on mon, wed, and Kimberly Leon MD   Arformoterol Tartrate (BROVANA) 15 MCG/2ML [facility] inhale contents of ONE vial in [facility] MD Damon   simvastatin (ZOCOR) 20 MG tablet TAKE ONE TABLET BY MOUTH AT BEDTIME  Wilfredo Michael MD   metoprolol tartrate (LOPRESSOR) 25 MG tablet TAKE ONE TABLET BY MOUTH TWICE DAILY  Wilfredo Michael MD   Handicap Placard [facility] Pt with Aortic Stenosis I35.0 and Chonic respiratory disease J96.1  Wilfredo Michael MD   loratadine (CLARITIN) 10 MG tablet Take 10 mg by mouth daily. Historical Provider, MD   aspirin 81 MG chewable tablet Take 81 mg by mouth daily. Historical Provider, MD   acetaminophen (TYLENOL) 325 MG tablet Take 650 mg by mouth nightly. Historical Provider, MD     Allergies   Allergen Reactions    Albuterol     Celebrex [Celecoxib]     Miralax [Polyethylene Glycol] Nausea Only    Sulfa Antibiotics        Health Maintenance   Topic Date Due    DTaP/Tdap/Td vaccine (1 - Tdap) Never done    Lipid screen  05/07/2021    Flu vaccine (1) 09/01/2021    TSH testing  03/10/2022    Potassium monitoring  03/10/2022    Creatinine monitoring  03/10/2022    Annual Wellness Visit (AWV)  05/26/2022    Shingles Vaccine  Completed    Pneumococcal 65+ years Vaccine  Completed    COVID-19 Vaccine  Completed    Hepatitis A vaccine  Aged Out    Hepatitis B vaccine  Aged Out    Hib vaccine  Aged Out    Meningococcal (ACWY) vaccine  Aged Out       Subjective:      Review of Systems   Constitutional:        Pt's daughter concerned medications making tremors worse and wants to discuss dosages.     Musculoskeletal: Positive for gait problem. Neurological: Positive for tremors. Objective:     /84 (Site: Left Upper Arm, Position: Sitting, Cuff Size: Medium Adult)   Pulse 95   Ht 4' 8\" (1.422 m)   Wt 130 lb (59 kg)   SpO2 98%   BMI 29.15 kg/m²     Physical Exam  Vitals reviewed. Constitutional:       General: She is not in acute distress. Appearance: She is well-developed. She is obese. She is not ill-appearing or toxic-appearing. Comments: Occasional cough   HENT:      Head: Atraumatic. Eyes:      Conjunctiva/sclera: Conjunctivae normal.   Neck:      Thyroid: No thyromegaly. Vascular: No carotid bruit. Cardiovascular:      Rate and Rhythm: Normal rate and regular rhythm. Heart sounds: No murmur heard. Pulmonary:      Effort: Pulmonary effort is normal.      Breath sounds: Rhonchi (diffusely) present. No wheezing. Abdominal:      General: Bowel sounds are normal.      Palpations: Abdomen is soft. Musculoskeletal:         General: No swelling (BLE). Cervical back: Neck supple. Skin:     Findings: Bruising (4 cm lesion anterior left lower leg) present. Neurological:      Mental Status: She is alert and oriented to person, place, and time. Psychiatric:         Thought Content: Thought content normal.         Judgment: Judgment normal.         Assessment:     1. Memory deficit    2. Tremor    3. Pain and swelling of left lower leg    4. Alzheimer's disease, unspecified      No results found for this visit on 10/06/21. Plan:   No orders of the defined types were placed in this encounter. Return in about 4 weeks (around 11/3/2021) for Memory, CHF, afib, HTN. Patient Instructions   Encourage heat pad to area 3 times daily for swelling. Add namenda for memory issues       Discussed use, benefit, and side effects of prescribed medications. All patient questions answered. Pt voiced understanding. Patient agreed with treatment plan. Follow up as directed.      Electronically signed by Toñito De La Vega MD on 10/6/2021

## 2021-10-13 DIAGNOSIS — I35.0 SEVERE AORTIC STENOSIS: ICD-10-CM

## 2021-10-13 RX ORDER — BUMETANIDE 1 MG/1
TABLET ORAL
Qty: 130 TABLET | Refills: 1 | Status: SHIPPED | OUTPATIENT
Start: 2021-10-13

## 2021-10-13 NOTE — TELEPHONE ENCOUNTER
Sol Armas is requesting a refill on the following medication(s):  Requested Prescriptions     Pending Prescriptions Disp Refills    bumetanide (BUMEX) 1 MG tablet [Pharmacy Med Name: bumetanide 1 mg tablet] 130 tablet 1     Sig: take 1 tablet by mouth once daily on tuesday, thursday, saturday, and sunday, then 1 tablet twice daily on mon, wed, and fri       Last Visit Date (If Applicable):  2/25/9276    Next Visit Date:    Visit date not found

## 2021-10-18 DIAGNOSIS — I35.0 SEVERE AORTIC STENOSIS: ICD-10-CM

## 2021-10-18 DIAGNOSIS — I10 ESSENTIAL HYPERTENSION: ICD-10-CM

## 2021-10-18 RX ORDER — SPIRONOLACTONE 25 MG/1
TABLET ORAL
Qty: 45 TABLET | Refills: 1 | Status: SHIPPED | OUTPATIENT
Start: 2021-10-18 | End: 2021-10-27

## 2021-10-18 NOTE — TELEPHONE ENCOUNTER
Uriah Carolina is requesting a refill on the following medication(s):  Requested Prescriptions     Pending Prescriptions Disp Refills    spironolactone (ALDACTONE) 25 MG tablet 45 tablet 1       Last Visit Date (If Applicable):  3/94/3442    Next Visit Date:    Visit date not found

## 2021-10-26 DIAGNOSIS — I35.0 SEVERE AORTIC STENOSIS: ICD-10-CM

## 2021-10-26 DIAGNOSIS — I10 ESSENTIAL HYPERTENSION: ICD-10-CM

## 2021-10-27 RX ORDER — TRAZODONE HYDROCHLORIDE 50 MG/1
TABLET ORAL
Qty: 45 TABLET | Refills: 1 | Status: SHIPPED | OUTPATIENT
Start: 2021-10-27

## 2021-10-27 RX ORDER — SIMVASTATIN 20 MG
TABLET ORAL
Qty: 90 TABLET | Refills: 3 | Status: SHIPPED | OUTPATIENT
Start: 2021-10-27

## 2021-10-27 RX ORDER — SPIRONOLACTONE 25 MG/1
TABLET ORAL
Qty: 45 TABLET | Refills: 1 | Status: SHIPPED | OUTPATIENT
Start: 2021-10-27

## 2021-10-27 NOTE — TELEPHONE ENCOUNTER
Clifton See is requesting a refill on the following medication(s):  Requested Prescriptions     Pending Prescriptions Disp Refills    spironolactone (ALDACTONE) 25 MG tablet [Pharmacy Med Name: spironolactone 25 mg tablet] 45 tablet 1     Sig: TAKE 1/2 TABLET BY MOUTH EVERY DAY    traZODone (DESYREL) 50 MG tablet [Pharmacy Med Name: trazodone 50 mg tablet] 45 tablet 1     Sig: TAKE 1/2 TABLET BY MOUTH at bedtime    simvastatin (ZOCOR) 20 MG tablet [Pharmacy Med Name: simvastatin 20 mg tablet] 90 tablet 3     Sig: TAKE ONE TABLET BY MOUTH AT BEDTIME       Last Visit Date (If Applicable):  2/87/5955    Next Visit Date:    Visit date not found

## 2021-11-03 ENCOUNTER — OFFICE VISIT (OUTPATIENT)
Dept: FAMILY MEDICINE CLINIC | Age: 86
End: 2021-11-03
Payer: MEDICARE

## 2021-11-03 VITALS
OXYGEN SATURATION: 98 % | DIASTOLIC BLOOD PRESSURE: 84 MMHG | HEART RATE: 78 BPM | SYSTOLIC BLOOD PRESSURE: 122 MMHG | BODY MASS INDEX: 29.02 KG/M2 | HEIGHT: 56 IN | WEIGHT: 129 LBS

## 2021-11-03 DIAGNOSIS — J44.9 CHRONIC OBSTRUCTIVE PULMONARY DISEASE, UNSPECIFIED COPD TYPE (HCC): ICD-10-CM

## 2021-11-03 DIAGNOSIS — J96.11 CHRONIC RESPIRATORY FAILURE WITH HYPOXIA (HCC): ICD-10-CM

## 2021-11-03 DIAGNOSIS — J44.9 COPD, SEVERITY TO BE DETERMINED (HCC): ICD-10-CM

## 2021-11-03 DIAGNOSIS — I35.0 SEVERE AORTIC STENOSIS: ICD-10-CM

## 2021-11-03 DIAGNOSIS — G30.9 ALZHEIMER'S DISEASE, UNSPECIFIED (CODE) (HCC): ICD-10-CM

## 2021-11-03 DIAGNOSIS — E03.9 ACQUIRED HYPOTHYROIDISM: ICD-10-CM

## 2021-11-03 DIAGNOSIS — R41.3 MEMORY DEFICIT: ICD-10-CM

## 2021-11-03 DIAGNOSIS — I50.42 CHRONIC COMBINED SYSTOLIC AND DIASTOLIC CONGESTIVE HEART FAILURE (HCC): Primary | ICD-10-CM

## 2021-11-03 DIAGNOSIS — I10 ESSENTIAL HYPERTENSION: ICD-10-CM

## 2021-11-03 LAB — TSH SERPL DL<=0.05 MIU/L-ACNC: 1.15 MIU/ML (ref 0.49–4.67)

## 2021-11-03 PROCEDURE — 1090F PRES/ABSN URINE INCON ASSESS: CPT | Performed by: FAMILY MEDICINE

## 2021-11-03 PROCEDURE — G8417 CALC BMI ABV UP PARAM F/U: HCPCS | Performed by: FAMILY MEDICINE

## 2021-11-03 PROCEDURE — 1123F ACP DISCUSS/DSCN MKR DOCD: CPT | Performed by: FAMILY MEDICINE

## 2021-11-03 PROCEDURE — 99214 OFFICE O/P EST MOD 30 MIN: CPT | Performed by: FAMILY MEDICINE

## 2021-11-03 PROCEDURE — 99214 OFFICE O/P EST MOD 30 MIN: CPT

## 2021-11-03 PROCEDURE — 3023F SPIROM DOC REV: CPT | Performed by: FAMILY MEDICINE

## 2021-11-03 PROCEDURE — G8427 DOCREV CUR MEDS BY ELIG CLIN: HCPCS | Performed by: FAMILY MEDICINE

## 2021-11-03 PROCEDURE — G8926 SPIRO NO PERF OR DOC: HCPCS | Performed by: FAMILY MEDICINE

## 2021-11-03 PROCEDURE — G8484 FLU IMMUNIZE NO ADMIN: HCPCS | Performed by: FAMILY MEDICINE

## 2021-11-03 PROCEDURE — 4040F PNEUMOC VAC/ADMIN/RCVD: CPT | Performed by: FAMILY MEDICINE

## 2021-11-03 PROCEDURE — 1036F TOBACCO NON-USER: CPT | Performed by: FAMILY MEDICINE

## 2021-11-03 RX ORDER — ARFORMOTEROL TARTRATE 15 UG/2ML
SOLUTION RESPIRATORY (INHALATION)
Qty: 360 ML | Refills: 5 | Status: SHIPPED | OUTPATIENT
Start: 2021-11-03

## 2021-11-03 RX ORDER — LEVOTHYROXINE SODIUM 0.07 MG/1
TABLET ORAL
Qty: 90 TABLET | Refills: 2 | Status: SHIPPED | OUTPATIENT
Start: 2021-11-03

## 2021-11-03 RX ORDER — HYDRALAZINE HYDROCHLORIDE 25 MG/1
TABLET, FILM COATED ORAL
Qty: 180 TABLET | Refills: 1 | Status: SHIPPED | OUTPATIENT
Start: 2021-11-03

## 2021-11-03 RX ORDER — BUDESONIDE 0.5 MG/2ML
INHALANT ORAL
Qty: 180 ML | Refills: 5 | Status: SHIPPED | OUTPATIENT
Start: 2021-11-03

## 2021-11-03 ASSESSMENT — ENCOUNTER SYMPTOMS
SHORTNESS OF BREATH: 0
ABDOMINAL PAIN: 0

## 2021-11-03 NOTE — PROGRESS NOTES
[address] [address]  Dept: [phone]  Dept Fax: [phone]    Ny Damon is a 80 y.o. female who presents today for her medical conditions/complaints as noted below.   Ny Damon c/o of Congestive Heart Failure, COPD, and Chronic Kidney Disease      HPI:     HPI  Here for follow up of CHF, HTN, Hyperlipidemia, Hypothyroid and COPD  Taking all medications regularly  No side effects noted    other complaint currently- doing alright per pt    BP Readings from Last 3 Encounters:   11/03/21 122/84   10/06/21 122/84   08/13/21 (!) 128/90          (goal 120/80)    Past Medical History:   Diagnosis Date    Acquired hypothyroidism 11/2/2020    Anemia     with negative gastrointestinal workup    Anxiety     Aspiration pneumonia ([address]     Atrial fib/flutter, transient     Basal cell carcinoma of skin     BMI 31.0-31.9,adult 11/2/2020    CAD (coronary artery disease)     CHF (congestive heart failure) (HCC)     Chronic congestive heart failure (HCC)     Chronic constipation     Chronic pain of both shoulders 5/23/2017    Chronic respiratory failure with hypoxia ([address] 8/13/2018    CKD (chronic kidney disease) stage 3, GFR 30-59 ml/min (formerly [facility]) 11/7/2017    COPD, severity to be determined ([address]     Cough     Severe, unremitting - Post-viral cough syndrome    Depression     Diverticulosis, acute     Epistaxis     Essential hypertension 11/7/2017    GERD (gastroesophageal reflux disease)     H/O tracheostomy     for prolonged respiratory failure after aortic valve replacement    Hypercholesterolemia     Hypertension     Hypothyroid     Hypothyroidism     Hypoxia 6/3/2019    Left ventricular diastolic dysfunction     Memory deficit 3/5/2020    Mitral regurgitation     Mixed hyperlipidemia 11/7/2017    Mood disorder ([address] 11/7/2017    Obesity     Osteopenia     Paroxysmal atrial fibrillation (HCC)     Peptic ulcer  Postnasal drip     without evidence for chronic sinusitis    Reactive airway disease     Respiratory failure (HCC)     Chronic respiratory failure with hypoxia    Ringworm     Recurrent    Severe aortic stenosis 08/30/2010    23mm benito-lozano AVR, post aortic valve replacement    Severe aortic stenosis     Shortness of breath     Sleep apnea     Sternal wound dehiscence     related to coughing, post rewiring in 2010      Past Surgical History:   Procedure Laterality Date    AORTIC VALVE REPLACEMENT  8/30/10    23mm benito-lozano    APPENDECTOMY      BLADDER SUSPENSION      CATARACT REMOVAL WITH IMPLANT Bilateral 2002    CHOLECYSTECTOMY      EYE SURGERY  04/2014    Left eye laser surgery    HEMORRHOID SURGERY      KNEE ARTHROPLASTY      left    OTHER SURGICAL HISTORY  9/14/10    sternal rewiring    REFRACTIVE SURGERY      left    SKIN CANCER EXCISION      TONSILLECTOMY AND ADENOIDECTOMY         Family History   Problem Relation Age of Onset    Cancer Father     Hypertension Mother     Stroke Mother     COPD Brother     Parkinsonism Brother        Social History     Tobacco Use    Smoking status: Never Smoker    Smokeless tobacco: Never Used   Substance Use Topics    Alcohol use: No      Prior to Visit Medications    Medication Sig Taking?  Authorizing Provider   spironolactone (ALDACTONE) 25 MG tablet TAKE 1/2 TABLET BY MOUTH Ihsan Mariano MD   traZODone (DESYREL) 50 MG tablet TAKE 1/2 TABLET BY MOUTH at bedtime  Dayna Martinez MD   simvastatin (ZOCOR) 20 MG tablet TAKE ONE TABLET BY MOUTH AT BEDTIME  Dayna Martinez MD   bumetanide (BUMEX) 1 MG tablet take 1 tablet by mouth once daily on tuesday, thursday, saturday, and sunday, then 1 tablet twice daily on mon, wed, and Robles Mustafa MD   memantine (NAMENDA) 5 MG tablet Take 1 tablet by mouth 2 times daily  Dayna Martinez MD   budesonide (PULMICORT) 0.5 MG/2ML nebulizer suspension use 1 vial in NEBULIZER Ludmila Rudolph MD   levothyroxine (SYNTHROID) 75 MCG tablet TAKE ONE TABLET BY MOUTH EVERY Lynsey Castro MD   omeprazole (PRILOSEC) 20 MG delayed release capsule TAKE ONE CAPSULE BY MOUTH EVERY Lynsey Castro MD   hydrALAZINE (APRESOLINE) 25 MG tablet Poonam Tan MD   citalopram (CELEXA) 10 MG tablet TAKE 1/2 TABLET BY MOUTH EVERY Lynsey Castro MD   rivastigmine (EXELON) 6 MG capsule TAKE ONE CAPSULE BY MOUTH TWICE DAILY  Merlin Burks MD   Arformoterol Tartrate (BROVANA) 15 MCG/2ML NEBU inhale contents of ONE vial in [place], MD   metoprolol tartrate (LOPRESSOR) 25 MG tablet TAKE ONE TABLET BY MOUTH TWICE DAILY  Bhupendra Asencio MD   Handicap Placard [place] Pt with Aortic Stenosis I35.0 and Chonic respiratory disease J96.1  Bhupendra Asencio MD   loratadine (CLARITIN) 10 MG tablet Take 10 mg by mouth daily. Historical Provider, MD   aspirin 81 MG chewable tablet Take 81 mg by mouth daily. Historical Provider, MD   acetaminophen (TYLENOL) 325 MG tablet Take 650 mg by mouth nightly. Historical Provider, MD     Allergies   Allergen Reactions    Albuterol     Celebrex [Celecoxib]     Miralax [Polyethylene Glycol] Nausea Only    Sulfa Antibiotics        Health Maintenance   Topic Date Due    DTaP/Tdap/Td vaccine (1 - Tdap) Never done    Lipid screen  05/07/2021    COVID-19 Vaccine (3 - Pfizer booster) 11/24/2021    TSH testing  03/10/2022    Potassium monitoring  03/10/2022    Creatinine monitoring  03/10/2022    Annual Wellness Visit (AWV)  05/26/2022    Flu vaccine  Completed    Shingles Vaccine  Completed    Pneumococcal 65+ years Vaccine  Completed    Hepatitis A vaccine  Aged Out    Hepatitis B vaccine  Aged Out    Hib vaccine  Aged Out    Meningococcal (ACWY) vaccine  Aged Out       Subjective:      Review of Systems   Constitutional: Negative for fatigue.    Eyes: Negative for visual disturbance. Respiratory: Negative for shortness of breath. Cardiovascular: Negative for chest pain, palpitations and leg swelling. Gastrointestinal: Negative for abdominal pain. Genitourinary: Negative for difficulty urinating. Neurological: Negative for dizziness and headaches. Psychiatric/Behavioral: Negative for behavioral problems and sleep disturbance. The patient is not nervous/anxious. Objective:     /84 (Site: Left Upper Arm, Position: Sitting, Cuff Size: Medium Adult)   Pulse 78   Ht 4' 8\" (1.422 m)   Wt 129 lb (58.5 kg)   SpO2 98%   BMI 28.92 kg/m²     Physical Exam  Vitals reviewed. Constitutional:       General: She is not in acute distress. Appearance: She is well-developed. She is not ill-appearing. Comments: Very [place]  Weight stable   HENT:      Head: Atraumatic. Eyes:      Conjunctiva/sclera: Conjunctivae normal.   Neck:      Thyroid: No thyromegaly. Vascular: No carotid bruit. Cardiovascular:      Rate and Rhythm: Normal rate. Pulmonary:      Effort: Pulmonary effort is normal. No respiratory distress. Breath sounds: Normal breath sounds. Comments: O 2 in place  Abdominal:      General: Bowel sounds are normal.      Palpations: Abdomen is soft. Musculoskeletal:         General: No swelling (BLE). Cervical back: Neck supple. Neurological:      Mental Status: She is alert and oriented to person, place, and time. Psychiatric:         Thought Content: Thought content normal.         Judgment: Judgment normal.      Comments: [place] noted       Weight stable past 3 months    Assessment:     1. Chronic combined systolic and diastolic congestive heart failure ([address]    2. COPD, severity to be determined ([address]    3. Acquired hypothyroidism    4. Alzheimer's disease, unspecified    5. Severe aortic stenosis    6. Essential hypertension    7. Memory deficit    8. Chronic respiratory failure with hypoxia (HCC)    9.  Chronic obstructive pulmonary disease, unspecified ([facility]      No results found for this visit on 11/03/21. Plan:     Orders Placed This Encounter   Procedures    TSH without Reflex     Standing Status:   Future     Standing Expiration Date:   11/3/2022       Return in about 6 months (around 5/3/2022) for may go on recall list - attempting to get to see Dr Jess Bowers in [place]. Patient Instructions   May get covid booster after 11/24/21 when desired       Discussed use, benefit, and side effects of prescribed medications. All patient questions answered. Pt voiced understanding. Reviewed health maintenance reviewed booster covid after 11/24/21. Instructed to continue current medications, diet and exercise. Patient agreed with treatment plan. Follow up as directed.      Electronically signed by Toñito De La Vega MD on 11/3/2021

## 2023-04-11 NOTE — PROGRESS NOTES
[address] [facility]  Dept: [phone]  Dept Fax:[phone]    Shawna Whipple is a 80 y.o. female who presents todayfor her medical conditions/complaints as noted below. Shawna Whipple is c/o of 6 Month Follow-Up; Hypertension; and Thyroid Problem            HPI:     HPI  Here for follow up of Severe aortic stenosis, hx Afib, CKD stage 3, HTN, Hyperlipidemia and COPD, hypothyroid  Taking all medications regularly  No side effects noted  Pt continues using O2 at night and with activity as per Dr Hermann Garcia- Pulmonology follow up since 9/2017. Getting injections into shoulder every couple months  Had ECHO next week and follow up with Dr Jelani Lang following that. Due for O2 recertification and not sure how to get with Dr Hermann Garcia office at [facility] V's affected by strike.         Noted other complaint currently had skin issue on right side of face, family noting list to accomplish during the day as getting very forgetful   Legs with bruised noted on anterior shin    Mirella risk comes up at 38% on this pt for 1 year mortality prediction    BP Readings from Last 3 Encounters:   05/13/19 132/74   11/07/18 130/70   08/13/18 128/62          (goal 120/80)    Past Medical History:   Diagnosis Date    Anemia     with negative gastrointestinal workup    Anxiety     Aspiration pneumonia (HCC)     Atrial fib/flutter, transient     Basal cell carcinoma of skin     CAD (coronary artery disease)     CHF (congestive heart failure) (HCC)     Chronic constipation     Cough     Severe, unremitting    Depression     Diverticulosis, acute     Epistaxis     GERD (gastroesophageal reflux disease)     H/O tracheostomy     for prolonged respiratory failure after aortic valve replacement    Hypercholesterolemia     Hypertension     Hypothyroid     Hypothyroidism     Left ventricular diastolic dysfunction     Mitral regurgitation     Obesity     Osteopenia     Heartburn Peptic ulcer     Postnasal drip     without evidence for chronic sinusitis    Reactive airway disease     Respiratory failure (HCC)     Ringworm     Recurrent    Severe aortic stenosis 08/30/2010    23mm benito-lozano AVR, post aortic valve replacement    Shortness of breath     Sleep apnea     Sternal wound dehiscence     related to coughing, post rewiring in 2010      Past Surgical History:   Procedure Laterality Date    AORTIC VALVE REPLACEMENT  8/30/10    23mm benito-lozano    APPENDECTOMY      BLADDER SUSPENSION      CATARACT REMOVAL WITH IMPLANT Bilateral 2002    CHOLECYSTECTOMY      EYE SURGERY  04/2014    Left eye laser surgery    HEMORRHOID SURGERY      KNEE ARTHROPLASTY      left    OTHER SURGICAL HISTORY  9/14/10    sternal rewiring    REFRACTIVE SURGERY      left    SKIN CANCER EXCISION      TONSILLECTOMY AND ADENOIDECTOMY         Family History   Problem Relation Age of Onset    Cancer Father     Hypertension Mother     Stroke Mother     COPD Brother     Parkinsonism Brother        Social History     Tobacco Use    Smoking status: Never Smoker    Smokeless tobacco: Never Used   Substance Use Topics    Alcohol use: No      Current Outpatient Medications   Medication Sig Dispense Refill    Arformoterol Tartrate (BROVANA) 15 MCG/2ML NEBU inhale contents of 1 vial IN NEBULIZER twice a day 120 mL 5    budesonide (PULMICORT) 0.5 MG/2ML nebulizer suspension Take 2 mLs by nebulization 2 times daily 60 ampule 5    levothyroxine (SYNTHROID) 75 MCG tablet take 1 tablet by mouth once daily 90 tablet 1    hydrALAZINE (APRESOLINE) 25 MG tablet take 1 tablet by mouth three times a day AT NOON, DINNER, AND AT NIGHT NOT IN THE MORNING 270 tablet 5    omeprazole (PRILOSEC) 20 MG delayed release capsule take 1 capsule by mouth twice a day (Patient taking differently: take 1 capsule by mouth twice a day PRN) 180 capsule 1    simvastatin (ZOCOR) 20 MG tablet Take 1 tablet by mouth nightly 90 tablet 3    metoprolol tartrate (LOPRESSOR) 25 MG tablet take 1 tablet by mouth twice a day 120 tablet 3    ferrous sulfate 325 (65 Fe) MG tablet take 1 tablet by mouth once daily 90 tablet 3    citalopram (CELEXA) 10 MG tablet take 1/2 tablet by mouth once daily 90 tablet 3    bumetanide (BUMEX) 1 MG tablet TAKE 1 TABLET BY MOUTH ON TUES, THURS, SAT, AND SUN, AND 1 TAB BY MOUTH TWICE DAILY ON MON. WED, AND  tablet 1    traZODone (DESYREL) 50 MG tablet take 1/2 tablet by mouth at bedtime 75 tablet 3    spironolactone (ALDACTONE) 50 MG tablet take 1 tablet by mouth once daily (Patient taking differently: take 1/2 tablet by mouth once daily) 90 tablet 1    Handicap Placard [facility] Pt with Aortic Stenosis I35.0 and Chonic respiratory disease J96.1 2 each 0    loratadine (CLARITIN) 10 MG tablet Take 10 mg by mouth daily.  aspirin 81 MG chewable tablet Take 81 mg by mouth daily.  acetaminophen (TYLENOL) 325 MG tablet Take 650 mg by mouth nightly. No current facility-administered medications for this visit. Allergies   Allergen Reactions    Albuterol     Celebrex [Celecoxib]     Miralax [Polyethylene Glycol] Nausea Only    Sulfa Antibiotics        Health Maintenance   Topic Date Due    DTaP/Tdap/Td vaccine (1 - Tdap) 05/07/2021 (Originally 11/14/1944)    Potassium monitoring  04/30/2020    Creatinine monitoring  04/30/2020    Flu vaccine  Completed    Shingles Vaccine  Completed    Pneumococcal 65+ years Vaccine  Completed       Subjective:      Review of Systems   Constitutional: Negative for fatigue. Here for routine follow up. Getting shoulder injections every 2 months    Respiratory: Positive for shortness of breath (no more than her normal). Cardiovascular: Negative for chest pain, palpitations and leg swelling. Genitourinary: Negative for frequency. Neurological: Negative for dizziness. Home BP Checks? yes, at rehab 1-2 times daily  Medication Compliant? yes    Objective:     /74   Pulse 62   Ht 4' 10\" (1.473 m)   Wt 146 lb (66.2 kg)   SpO2 95%   BMI 30.51 kg/m²   Physical Exam   Constitutional: She is oriented to person, place, and time. She appears well-developed and well-nourished. No distress. Cardiovascular: Normal rate. Pulmonary/Chest: Effort normal. She has decreased breath sounds (throughout). Musculoskeletal: She exhibits no edema. Neurological: She is alert and oriented to person, place, and time. Skin:   2 x 4 mm lesion on medial left cheek with some sensativity. TSH 1.141 on 4/30/19    Lab Results   Component Value Date    CREATININE 1.00 04/30/2019     Lab Results   Component Value Date     04/30/2019    K 3.8 04/30/2019     04/30/2019    CO2 32 04/30/2019       Assessment:      1. Essential hypertension    2. Chronic respiratory failure with hypoxia (HCC)    3. CKD (chronic kidney disease) stage 3, GFR 30-59 ml/min ([facility])    4. Severe aortic stenosis    5. Non-rheumatic mitral regurgitation    6. Paroxysmal atrial fibrillation (HCC)    7. COPD, severity to be determined ([address]    8. Chronic congestive heart failure, unspecified heart failure type ([facility])               Quality & Risk Score Accuracy    Visit Dx:  J44.9 - COPD, severity to be determined ([address]  Assessment and plan:  Stable based upon symptoms and exam. Continue current treatment plan and follow up at least yearly. Visit Dx:  I50.9 - Chronic congestive heart failure, unspecified heart failure type ([address]  Assessment and plan:  Stable based upon symptoms and exam. Continue current treatment plan and follow up at least yearly. Last edited 05/13/19 21:56 EDT by Alley Carter MD           Plan:     There are no Patient Instructions on file for this visit. No orders of the defined types were placed in this encounter. No orders of the defined types were placed in this encounter.        Return in about 6 months (around 11/13/2019) for HTN. Discussed use, benefit, and side effects of prescribed medications. All patient questions answered. Pt voiced understanding. Reviewed health maintenance. Instructed to continue current medications, diet and exercise. Patient agreed with treatment plan. Follow up as directed.      Electronically signedby Socorro Monae MD on 5/13/2019